# Patient Record
Sex: FEMALE | Race: OTHER | Employment: UNEMPLOYED | URBAN - METROPOLITAN AREA
[De-identification: names, ages, dates, MRNs, and addresses within clinical notes are randomized per-mention and may not be internally consistent; named-entity substitution may affect disease eponyms.]

---

## 2019-01-02 ENCOUNTER — APPOINTMENT (OUTPATIENT)
Dept: GENERAL RADIOLOGY | Age: 29
End: 2019-01-02

## 2019-01-02 ENCOUNTER — HOSPITAL ENCOUNTER (EMERGENCY)
Age: 29
Discharge: LEFT BEFORE DISCHARGE | End: 2019-01-02
Attending: EMERGENCY MEDICINE

## 2019-01-02 VITALS
HEART RATE: 95 BPM | WEIGHT: 160 LBS | SYSTOLIC BLOOD PRESSURE: 155 MMHG | OXYGEN SATURATION: 100 % | TEMPERATURE: 98 F | DIASTOLIC BLOOD PRESSURE: 93 MMHG | RESPIRATION RATE: 14 BRPM | HEIGHT: 67 IN | BODY MASS INDEX: 25.11 KG/M2

## 2019-01-02 DIAGNOSIS — R07.9 CHEST PAIN, UNSPECIFIED TYPE: Primary | ICD-10-CM

## 2019-01-02 DIAGNOSIS — N93.9 VAGINAL BLEEDING: ICD-10-CM

## 2019-01-02 LAB
-: ABNORMAL
ABSOLUTE EOS #: 0.05 K/UL (ref 0–0.44)
ABSOLUTE IMMATURE GRANULOCYTE: <0.03 K/UL (ref 0–0.3)
ABSOLUTE LYMPH #: 1.81 K/UL (ref 1.1–3.7)
ABSOLUTE MONO #: 0.22 K/UL (ref 0.1–1.2)
AMORPHOUS: ABNORMAL
ANION GAP SERPL CALCULATED.3IONS-SCNC: 17 MMOL/L (ref 9–17)
BACTERIA: ABNORMAL
BASOPHILS # BLD: 0 % (ref 0–2)
BASOPHILS ABSOLUTE: <0.03 K/UL (ref 0–0.2)
BILIRUBIN URINE: ABNORMAL
BUN BLDV-MCNC: 11 MG/DL (ref 6–20)
BUN/CREAT BLD: ABNORMAL (ref 9–20)
CALCIUM SERPL-MCNC: 9.3 MG/DL (ref 8.6–10.4)
CASTS UA: ABNORMAL /LPF (ref 0–8)
CHLORIDE BLD-SCNC: 102 MMOL/L (ref 98–107)
CO2: 25 MMOL/L (ref 20–31)
COLOR: ABNORMAL
COMMENT UA: ABNORMAL
CREAT SERPL-MCNC: 0.74 MG/DL (ref 0.5–0.9)
CRYSTALS, UA: ABNORMAL /HPF
DIFFERENTIAL TYPE: ABNORMAL
EKG ATRIAL RATE: 78 BPM
EKG P AXIS: 67 DEGREES
EKG P-R INTERVAL: 138 MS
EKG Q-T INTERVAL: 382 MS
EKG QRS DURATION: 84 MS
EKG QTC CALCULATION (BAZETT): 435 MS
EKG R AXIS: 78 DEGREES
EKG T AXIS: 63 DEGREES
EKG VENTRICULAR RATE: 78 BPM
EOSINOPHILS RELATIVE PERCENT: 1 % (ref 1–4)
EPITHELIAL CELLS UA: ABNORMAL /HPF (ref 0–5)
GFR AFRICAN AMERICAN: >60 ML/MIN
GFR NON-AFRICAN AMERICAN: >60 ML/MIN
GFR SERPL CREATININE-BSD FRML MDRD: ABNORMAL ML/MIN/{1.73_M2}
GFR SERPL CREATININE-BSD FRML MDRD: ABNORMAL ML/MIN/{1.73_M2}
GLUCOSE BLD-MCNC: 100 MG/DL (ref 70–99)
GLUCOSE URINE: NEGATIVE
HCG QUALITATIVE: NEGATIVE
HCT VFR BLD CALC: 38.2 % (ref 36.3–47.1)
HEMOGLOBIN: 11.2 G/DL (ref 11.9–15.1)
IMMATURE GRANULOCYTES: 0 %
KETONES, URINE: NEGATIVE
LEUKOCYTE ESTERASE, URINE: ABNORMAL
LYMPHOCYTES # BLD: 38 % (ref 24–43)
MCH RBC QN AUTO: 25.5 PG (ref 25.2–33.5)
MCHC RBC AUTO-ENTMCNC: 29.3 G/DL (ref 28.4–34.8)
MCV RBC AUTO: 86.8 FL (ref 82.6–102.9)
MONOCYTES # BLD: 5 % (ref 3–12)
MUCUS: ABNORMAL
NITRITE, URINE: NEGATIVE
NRBC AUTOMATED: 0 PER 100 WBC
OTHER OBSERVATIONS UA: ABNORMAL
PDW BLD-RTO: 14.2 % (ref 11.8–14.4)
PH UA: 5 (ref 5–8)
PLATELET # BLD: 216 K/UL (ref 138–453)
PLATELET ESTIMATE: ABNORMAL
PMV BLD AUTO: 10.3 FL (ref 8.1–13.5)
POTASSIUM SERPL-SCNC: 3.7 MMOL/L (ref 3.7–5.3)
PROTEIN UA: ABNORMAL
RBC # BLD: 4.4 M/UL (ref 3.95–5.11)
RBC # BLD: ABNORMAL 10*6/UL
RBC UA: ABNORMAL /HPF (ref 0–4)
RENAL EPITHELIAL, UA: ABNORMAL /HPF
SEG NEUTROPHILS: 56 % (ref 36–65)
SEGMENTED NEUTROPHILS ABSOLUTE COUNT: 2.61 K/UL (ref 1.5–8.1)
SODIUM BLD-SCNC: 144 MMOL/L (ref 135–144)
SPECIFIC GRAVITY UA: 1.04 (ref 1–1.03)
TRICHOMONAS: ABNORMAL
TROPONIN INTERP: NORMAL
TROPONIN T: NORMAL NG/ML
TROPONIN, HIGH SENSITIVITY: <6 NG/L (ref 0–14)
TURBIDITY: ABNORMAL
URINE HGB: ABNORMAL
UROBILINOGEN, URINE: NORMAL
WBC # BLD: 4.7 K/UL (ref 3.5–11.3)
WBC # BLD: ABNORMAL 10*3/UL
WBC UA: ABNORMAL /HPF (ref 0–5)
YEAST: ABNORMAL

## 2019-01-02 PROCEDURE — 93005 ELECTROCARDIOGRAM TRACING: CPT

## 2019-01-02 PROCEDURE — 80048 BASIC METABOLIC PNL TOTAL CA: CPT

## 2019-01-02 PROCEDURE — 85025 COMPLETE CBC W/AUTO DIFF WBC: CPT

## 2019-01-02 PROCEDURE — 81001 URINALYSIS AUTO W/SCOPE: CPT

## 2019-01-02 PROCEDURE — 84703 CHORIONIC GONADOTROPIN ASSAY: CPT

## 2019-01-02 PROCEDURE — 71046 X-RAY EXAM CHEST 2 VIEWS: CPT

## 2019-01-02 PROCEDURE — 87086 URINE CULTURE/COLONY COUNT: CPT

## 2019-01-02 PROCEDURE — 84484 ASSAY OF TROPONIN QUANT: CPT

## 2019-01-02 PROCEDURE — 99285 EMERGENCY DEPT VISIT HI MDM: CPT

## 2019-01-02 ASSESSMENT — PAIN DESCRIPTION - ORIENTATION: ORIENTATION: RIGHT;LOWER

## 2019-01-02 ASSESSMENT — PAIN DESCRIPTION - LOCATION: LOCATION: CHEST

## 2019-01-02 ASSESSMENT — PAIN SCALES - GENERAL: PAINLEVEL_OUTOF10: 10

## 2019-01-02 ASSESSMENT — PAIN DESCRIPTION - PAIN TYPE: TYPE: ACUTE PAIN

## 2019-01-03 LAB
CULTURE: NORMAL
Lab: NORMAL
SPECIMEN DESCRIPTION: NORMAL
STATUS: NORMAL

## 2019-01-03 ASSESSMENT — ENCOUNTER SYMPTOMS
BACK PAIN: 0
COLOR CHANGE: 0
SHORTNESS OF BREATH: 0
VOMITING: 0
NAUSEA: 0
WHEEZING: 0
ABDOMINAL PAIN: 0

## 2020-03-08 ENCOUNTER — HOSPITAL ENCOUNTER (EMERGENCY)
Age: 30
Discharge: HOME | End: 2020-03-09
Attending: EMERGENCY MEDICINE
Payer: MEDICAID

## 2020-03-08 ENCOUNTER — APPOINTMENT (OUTPATIENT)
Dept: GENERAL RADIOLOGY | Age: 30
End: 2020-03-08
Payer: MEDICAID

## 2020-03-08 VITALS
OXYGEN SATURATION: 98 % | SYSTOLIC BLOOD PRESSURE: 123 MMHG | TEMPERATURE: 98 F | DIASTOLIC BLOOD PRESSURE: 74 MMHG | RESPIRATION RATE: 12 BRPM | BODY MASS INDEX: 21.14 KG/M2 | HEART RATE: 83 BPM | WEIGHT: 135 LBS

## 2020-03-08 LAB
ABSOLUTE EOS #: 0.19 K/UL (ref 0–0.44)
ABSOLUTE IMMATURE GRANULOCYTE: 0.03 K/UL (ref 0–0.3)
ABSOLUTE LYMPH #: 1.68 K/UL (ref 1.1–3.7)
ABSOLUTE MONO #: 0.55 K/UL (ref 0.1–1.2)
ANION GAP SERPL CALCULATED.3IONS-SCNC: 13 MMOL/L (ref 9–17)
BASOPHILS # BLD: 0 % (ref 0–2)
BASOPHILS ABSOLUTE: 0.03 K/UL (ref 0–0.2)
BUN BLDV-MCNC: 5 MG/DL (ref 6–20)
BUN/CREAT BLD: ABNORMAL (ref 9–20)
CALCIUM SERPL-MCNC: 8.9 MG/DL (ref 8.6–10.4)
CHLORIDE BLD-SCNC: 101 MMOL/L (ref 98–107)
CO2: 21 MMOL/L (ref 20–31)
CREAT SERPL-MCNC: 0.73 MG/DL (ref 0.5–0.9)
DIFFERENTIAL TYPE: ABNORMAL
EOSINOPHILS RELATIVE PERCENT: 2 % (ref 1–4)
GFR AFRICAN AMERICAN: >60 ML/MIN
GFR NON-AFRICAN AMERICAN: >60 ML/MIN
GFR SERPL CREATININE-BSD FRML MDRD: ABNORMAL ML/MIN/{1.73_M2}
GFR SERPL CREATININE-BSD FRML MDRD: ABNORMAL ML/MIN/{1.73_M2}
GLUCOSE BLD-MCNC: 97 MG/DL (ref 70–99)
HCG QUALITATIVE: NEGATIVE
HCT VFR BLD CALC: 40.2 % (ref 36.3–47.1)
HEMOGLOBIN: 12.3 G/DL (ref 11.9–15.1)
IMMATURE GRANULOCYTES: 0 %
LYMPHOCYTES # BLD: 22 % (ref 24–43)
MCH RBC QN AUTO: 26.3 PG (ref 25.2–33.5)
MCHC RBC AUTO-ENTMCNC: 30.6 G/DL (ref 28.4–34.8)
MCV RBC AUTO: 85.9 FL (ref 82.6–102.9)
MONOCYTES # BLD: 7 % (ref 3–12)
NRBC AUTOMATED: 0 PER 100 WBC
PDW BLD-RTO: 14.3 % (ref 11.8–14.4)
PLATELET # BLD: 229 K/UL (ref 138–453)
PLATELET ESTIMATE: ABNORMAL
PMV BLD AUTO: 10.7 FL (ref 8.1–13.5)
POTASSIUM SERPL-SCNC: 4 MMOL/L (ref 3.7–5.3)
RBC # BLD: 4.68 M/UL (ref 3.95–5.11)
RBC # BLD: ABNORMAL 10*6/UL
SEG NEUTROPHILS: 69 % (ref 36–65)
SEGMENTED NEUTROPHILS ABSOLUTE COUNT: 5.29 K/UL (ref 1.5–8.1)
SODIUM BLD-SCNC: 135 MMOL/L (ref 135–144)
TROPONIN INTERP: NORMAL
TROPONIN T: NORMAL NG/ML
TROPONIN, HIGH SENSITIVITY: 7 NG/L (ref 0–14)
WBC # BLD: 7.8 K/UL (ref 3.5–11.3)
WBC # BLD: ABNORMAL 10*3/UL

## 2020-03-08 PROCEDURE — 93005 ELECTROCARDIOGRAM TRACING: CPT | Performed by: EMERGENCY MEDICINE

## 2020-03-08 PROCEDURE — 80048 BASIC METABOLIC PNL TOTAL CA: CPT

## 2020-03-08 PROCEDURE — 2580000003 HC RX 258: Performed by: EMERGENCY MEDICINE

## 2020-03-08 PROCEDURE — 6360000002 HC RX W HCPCS: Performed by: EMERGENCY MEDICINE

## 2020-03-08 PROCEDURE — 96365 THER/PROPH/DIAG IV INF INIT: CPT

## 2020-03-08 PROCEDURE — 99284 EMERGENCY DEPT VISIT MOD MDM: CPT

## 2020-03-08 PROCEDURE — 85025 COMPLETE CBC W/AUTO DIFF WBC: CPT

## 2020-03-08 PROCEDURE — 84484 ASSAY OF TROPONIN QUANT: CPT

## 2020-03-08 PROCEDURE — 71045 X-RAY EXAM CHEST 1 VIEW: CPT

## 2020-03-08 PROCEDURE — 96366 THER/PROPH/DIAG IV INF ADDON: CPT

## 2020-03-08 PROCEDURE — 84703 CHORIONIC GONADOTROPIN ASSAY: CPT

## 2020-03-08 RX ORDER — KETOROLAC TROMETHAMINE 30 MG/ML
30 INJECTION, SOLUTION INTRAMUSCULAR; INTRAVENOUS ONCE
Status: DISCONTINUED | OUTPATIENT
Start: 2020-03-08 | End: 2020-03-09 | Stop reason: HOSPADM

## 2020-03-08 RX ORDER — CEFAZOLIN SODIUM 1 G/50ML
1 INJECTION, SOLUTION INTRAVENOUS ONCE
Status: COMPLETED | OUTPATIENT
Start: 2020-03-08 | End: 2020-03-09

## 2020-03-08 RX ORDER — ONDANSETRON 2 MG/ML
4 INJECTION INTRAMUSCULAR; INTRAVENOUS ONCE
Status: DISCONTINUED | OUTPATIENT
Start: 2020-03-08 | End: 2020-03-09 | Stop reason: HOSPADM

## 2020-03-08 RX ORDER — NALOXONE HYDROCHLORIDE 0.4 MG/ML
0.4 INJECTION, SOLUTION INTRAMUSCULAR; INTRAVENOUS; SUBCUTANEOUS ONCE
Status: DISCONTINUED | OUTPATIENT
Start: 2020-03-08 | End: 2020-03-08

## 2020-03-08 RX ORDER — 0.9 % SODIUM CHLORIDE 0.9 %
1000 INTRAVENOUS SOLUTION INTRAVENOUS ONCE
Status: COMPLETED | OUTPATIENT
Start: 2020-03-08 | End: 2020-03-09

## 2020-03-08 RX ORDER — CEFAZOLIN SODIUM 1 G/50ML
INJECTION, SOLUTION INTRAVENOUS
Status: DISCONTINUED
Start: 2020-03-08 | End: 2020-03-09 | Stop reason: HOSPADM

## 2020-03-08 RX ORDER — NALOXONE HYDROCHLORIDE 1 MG/ML
2 INJECTION INTRAMUSCULAR; INTRAVENOUS; SUBCUTANEOUS ONCE
Status: COMPLETED | OUTPATIENT
Start: 2020-03-08 | End: 2020-03-09

## 2020-03-08 RX ADMIN — CEFAZOLIN SODIUM 1 G: 1 INJECTION, SOLUTION INTRAVENOUS at 22:30

## 2020-03-08 RX ADMIN — SODIUM CHLORIDE 1000 ML: 9 INJECTION, SOLUTION INTRAVENOUS at 22:30

## 2020-03-09 LAB
EKG ATRIAL RATE: 85 BPM
EKG P AXIS: 68 DEGREES
EKG P-R INTERVAL: 138 MS
EKG Q-T INTERVAL: 382 MS
EKG QRS DURATION: 80 MS
EKG QTC CALCULATION (BAZETT): 454 MS
EKG R AXIS: 60 DEGREES
EKG T AXIS: 44 DEGREES
EKG VENTRICULAR RATE: 85 BPM

## 2020-03-09 PROCEDURE — 6360000002 HC RX W HCPCS: Performed by: EMERGENCY MEDICINE

## 2020-03-09 PROCEDURE — 93010 ELECTROCARDIOGRAM REPORT: CPT | Performed by: INTERNAL MEDICINE

## 2020-03-09 PROCEDURE — 96375 TX/PRO/DX INJ NEW DRUG ADDON: CPT

## 2020-03-09 RX ORDER — IBUPROFEN 600 MG/1
600 TABLET ORAL EVERY 6 HOURS PRN
Qty: 30 TABLET | Refills: 0 | Status: SHIPPED | OUTPATIENT
Start: 2020-03-09 | End: 2022-07-28 | Stop reason: ALTCHOICE

## 2020-03-09 RX ORDER — CHLORHEXIDINE GLUCONATE 0.12 MG/ML
15 RINSE ORAL 2 TIMES DAILY
Qty: 420 ML | Refills: 0 | Status: SHIPPED | OUTPATIENT
Start: 2020-03-09 | End: 2020-03-23

## 2020-03-09 RX ORDER — NALOXONE HYDROCHLORIDE 1 MG/ML
2 INJECTION INTRAMUSCULAR; INTRAVENOUS; SUBCUTANEOUS ONCE
Status: COMPLETED | OUTPATIENT
Start: 2020-03-09 | End: 2020-03-09

## 2020-03-09 RX ORDER — ONDANSETRON 4 MG/1
4 TABLET, ORALLY DISINTEGRATING ORAL EVERY 8 HOURS PRN
Qty: 10 TABLET | Refills: 0 | Status: SHIPPED | OUTPATIENT
Start: 2020-03-09

## 2020-03-09 RX ORDER — MUPIROCIN CALCIUM 20 MG/G
CREAM TOPICAL 3 TIMES DAILY
Qty: 15 G | Refills: 0 | Status: SHIPPED | OUTPATIENT
Start: 2020-03-09 | End: 2022-09-13 | Stop reason: ALTCHOICE

## 2020-03-09 RX ORDER — AMMONIA INHALANTS 0.04 G/.3ML
INHALANT RESPIRATORY (INHALATION)
Status: DISCONTINUED
Start: 2020-03-09 | End: 2020-03-09 | Stop reason: HOSPADM

## 2020-03-09 RX ORDER — DOXYCYCLINE 100 MG/1
100 TABLET ORAL 2 TIMES DAILY
Qty: 20 TABLET | Refills: 0 | Status: SHIPPED | OUTPATIENT
Start: 2020-03-09 | End: 2020-03-19

## 2020-03-09 RX ADMIN — NALOXONE HYDROCHLORIDE 2 MG: 1 INJECTION PARENTERAL at 00:21

## 2020-03-09 RX ADMIN — NALOXONE HYDROCHLORIDE 2 MG: 1 INJECTION PARENTERAL at 00:14

## 2020-03-09 NOTE — ED PROVIDER NOTES
[facility]     Emergency Department     Faculty Attestation    I performed a history and physical examination of the patient and discussed management with the resident. I reviewed the resident´s note and agree with the documented findings and plan of care. Any areas of disagreement are noted on the chart. I was personally present for the key portions of any procedures. I have documented in the chart those procedures where I was not present during the key portions. I have reviewed the emergency nurses triage note. I agree with the chief complaint, past medical history, past surgical history, allergies, medications, social and family history as documented unless otherwise noted below. For Physician Assistant/ Nurse Practitioner cases/documentation I have personally evaluated this patient and have completed at least one if not all key elements of the E/M (history, physical exam, and MDM). Additional findings are as noted. Drowsy but answering questions appropriately, she was oriented to person place and the month, pupils are 2 mm and reactive, no meningeal signs, chest clear, heart exam normal, no lower extremity pain or swelling on examination. Old IV track marks on the forearms and the patient states she has not used in months. Patient states she took a pill today given to her by a friend but she does not know what it was. Patient brought in by the police.        EKG Interpretation    Interpreted by emergency department physician    Rhythm: normal sinus   Rate: normal/85  Axis: normal  Ectopy: none  Conduction: normal  ST Segments: no acute change  T Waves: no acute change  Q Waves: V1 and V2    Clinical Impression: Abnormal EKG    Flavio Aaron, CARLOS Aaron MD  03/08/20 7038

## 2020-03-14 NOTE — ED PROVIDER NOTES
[facility] ED  Emergency Department Encounter  EmergencyMedicine Resident     Pt Name:Maria Antonia Chaidez  MRN: [id number]  [place] 1990  Date of evaluation: 3/14/20  PCP:  Courtney Ahumada MD    CHIEF COMPLAINT       Chief Complaint   Patient presents with    Dizziness     pt. became lightheaded when being arrested by [employer]    Drug Overdose     pt. reports taking a pill from a friend, unknown substance       HISTORY OF PRESENT ILLNESS  (Location/Symptom, Timing/Onset, Context/Setting, Quality, Duration, Modifying Factors, Severity.)      Caio Sheriff is a 26 y.o. female who presents with [employer] for concern of drug overdose. Patient reportedly took a pill from her friend and is unsure of what it was. Patient does have a known history of IV drug abuse and opiate abuse. She is clinically intoxicated at this time, but reports no specific issues as of late. Patient is homeless. No recent fever or chills. She does feel little nauseated and vomited earlier today. Starting earlier today she also has had some dizziness and lightheadedness. Some chest pain and shortness of breath. She has no cardiac history and no known family cardiac history at a young age. She is not on any blood thinners. No known trauma or injury. She has no abdominal pain. She reports normal urination and defecation. She has an abnormal menstrual cycle but does not believe that she is pregnant at this time. No other complaints. PAST MEDICAL / SURGICAL / SOCIAL / FAMILY HISTORY      has no past medical history on file. has no past surgical history on file.     Social History     Socioeconomic History    Marital status: Single     Spouse name: Not on file    Number of children: Not on file    Years of education: Not on file    Highest education level: Not on file   Occupational History    Not on file   Social Needs    Financial resource strain: Not on file    Food insecurity     Worry: Not on file Inability: Not on file    Transportation needs     Medical: Not on file     Non-medical: Not on file   Tobacco Use    Smoking status: Current Every Day Smoker     Packs/day: 1.00     Types: Cigarettes    Smokeless tobacco: Never Used   Substance and Sexual Activity    Alcohol use: Yes     Comment: socially    Drug use: No    Sexual activity: Not on file   Lifestyle    Physical activity     Days per week: Not on file     Minutes per session: Not on file    Stress: Not on file   Relationships    Social connections     Talks on phone: Not on file     Gets together: Not on file     Attends Gnosticist service: Not on file     Active member of club or organization: Not on file     Attends meetings of clubs or organizations: Not on file     Relationship status: Not on file    Intimate partner violence     Fear of current or ex partner: Not on file     Emotionally abused: Not on file     Physically abused: Not on file     Forced sexual activity: Not on file   Other Topics Concern    Not on file   Social History Narrative    Not on file       History reviewed. No pertinent family history. Allergies:  Patient has no known allergies. Home Medications:  Prior to Admission medications    Medication Sig Start Date End Date Taking? Authorizing Provider   mupirocin (BACTROBAN) 2 % cream Apply topically 3 times daily Apply topically 3 times daily.  3/9/20  Yes David Rider MD   doxycycline monohydrate (ADOXA) 100 MG tablet Take 1 tablet by mouth 2 times daily for 10 days 3/9/20 3/19/20 Yes David Rider MD   chlorhexidine (PERIDEX) 0.12 % solution Take 15 mLs by mouth 2 times daily for 14 days 3/9/20 3/23/20 Yes David Rider MD   ibuprofen (ADVIL;MOTRIN) 600 MG tablet Take 1 tablet by mouth every 6 hours as needed for Pain 3/9/20  Yes David Rider MD   ondansetron (ZOFRAN ODT) 4 MG disintegrating tablet Take 1 tablet by mouth every 8 hours as needed for Nausea 3/9/20  Yes David Rider MD sounds: Normal breath sounds. No wheezing. Abdominal:      General: Bowel sounds are normal. There is no distension. Palpations: Abdomen is soft. Tenderness: There is no abdominal tenderness. There is no guarding or rebound. Musculoskeletal: Normal range of motion. General: No tenderness. Skin:     General: Skin is warm and dry. Findings: No rash. Neurological:      Mental Status: She is alert and oriented to person, place, and time. Psychiatric:         Behavior: Behavior normal.         DIFFERENTIAL  DIAGNOSIS     PLAN (LABS / IMAGING / EKG):  Orders Placed This Encounter   Procedures    XR CHEST PORTABLE    CBC WITH AUTO DIFFERENTIAL    BASIC METABOLIC PANEL    Troponin    Urinalysis Reflex to Culture    HCG Qualitative, Serum    EKG 12 Lead    EKG REPORT       MEDICATIONS ORDERED:  Orders Placed This Encounter   Medications    ceFAZolin (ANCEF) 1 g in dextrose 5 % 50 mL IVPB (premix)    0.9 % sodium chloride bolus    DISCONTD: ondansetron (ZOFRAN) injection 4 mg    DISCONTD: ketorolac (TORADOL) injection 30 mg    DISCONTD: naloxone (NARCAN) injection 0.4 mg    naloxone (NARCAN) injection 2 mg    DISCONTD: ceFAZolin (ANCEF) 1-4 GM/50ML-% IVPB (premix)     TRINH ALVAREZ: cabinet override    naloxone (NARCAN) injection 2 mg    DISCONTD: ammonia inhaler     TRINH ALVAREZ: cabinet override    mupirocin (BACTROBAN) 2 % cream     Sig: Apply topically 3 times daily Apply topically 3 times daily.      Dispense:  15 g     Refill:  0    doxycycline monohydrate (ADOXA) 100 MG tablet     Sig: Take 1 tablet by mouth 2 times daily for 10 days     Dispense:  20 tablet     Refill:  0    chlorhexidine (PERIDEX) 0.12 % solution     Sig: Take 15 mLs by mouth 2 times daily for 14 days     Dispense:  420 mL     Refill:  0    ibuprofen (ADVIL;MOTRIN) 600 MG tablet     Sig: Take 1 tablet by mouth every 6 hours as needed for Pain     Dispense:  30 tablet     Refill:  0    Value Ref Range    hCG Qual NEGATIVE NEGATIVE   EKG 12 Lead   Result Value Ref Range    Ventricular Rate 85 BPM    Atrial Rate 85 BPM    P-R Interval 138 ms    QRS Duration 80 ms    Q-T Interval 382 ms    QTc Calculation (Bazett) 454 ms    P Axis 68 degrees    R Axis 60 degrees    T Axis 44 degrees       RADIOLOGY:  Xr Chest Portable    Result Date: 3/8/2020  EXAMINATION: ONE XRAY VIEW OF THE CHEST 3/8/2020 10:51 pm COMPARISON: January 2, 2019 HISTORY: ORDERING SYSTEM PROVIDED HISTORY: chest pain TECHNOLOGIST PROVIDED HISTORY: chest pain Reason for Exam: upr,dizzy,od FINDINGS: The lungs are without acute focal process. There is no effusion or pneumothorax. The cardiomediastinal silhouette is without acute process. The osseous structures are without acute process. No acute process. EKG  EKG Interpretation    Interpreted by emergency department physician    Rhythm: normal sinus   Rate: normal  Axis: normal  Ectopy: none  Conduction: normal  ST Segments: normal  T Waves: normal  Q Waves: none    EKG  Impression: no acute changes and non-specific EKG        All EKG's are interpreted by the Emergency Department Physician who either signs or Co-signs this chart in the absence of a cardiologist.    EMERGENCY DEPARTMENT COURSE:  Patient seen and evaluated. She is laying in the bed and is sleeping, falls asleep multiple times throughout the examination and evaluation, but is easily arousable. Protecting her airway. On examination she is unkempt and is intoxicated again falling asleep and slurring words occasionally. She is in regular rate and rhythm, lungs are clear to auscultation bilaterally. She has no reproducible chest pain, although she reports some substernal chest pain and shortness of breath. She has no abdominal pain and is neurovascularly intact.   Patient has multiple lesions noted across her entire body from previous track marks from her IV drug use as well as some impetigo and superficial cellulitis. No abscesses noted that require drainage at this time. No other findings on examination. As patient is reporting chest pain or shortness of breath, a brief chest pain work-up was ordered. Patient given IV pain meds, antibiotics, antiemetics and IV fluids. Patient also given multiple doses of Narcan to keep her reverse the effects of her likely opioid ingestion. On review of the laboratory values, there are no acute abnormalities. Chest x-ray and EKG appear normal.  On reevaluation of the patient, she reported that her dizziness lightheadedness and chest pain were improving. Following additional Narcan and ammonium salts, patient was able to ambulate with some assistance. No additional intervention or work-up needed at this time. Patient stable ready to be discharged with police services. PROCEDURES:  None    CONSULTS:  None    CRITICAL CARE:  None    FINAL IMPRESSION      1. Accidental drug overdose, initial encounter    2. Chest pain, unspecified type    3. Dizziness    4. Impetigo    5. Cellulitis and abscess of leg    6. Gingivitis          DISPOSITION / PLAN     DISPOSITION Decision To Discharge 03/09/2020 12:20:35 AM      PATIENT REFERRED TO:  Claudeen Armour, MD  [address] [facility]  [phone]    Call in 2 days  As needed, If symptoms worsen      DISCHARGE MEDICATIONS:  Discharge Medication List as of 3/9/2020 12:25 AM      START taking these medications    Details   mupirocin (BACTROBAN) 2 % cream Apply topically 3 times daily Apply topically 3 times daily. , Topical, 3 TIMES DAILY Starting Mon 3/9/2020, Disp-15 g, R-0, Print      doxycycline monohydrate (ADOXA) 100 MG tablet Take 1 tablet by mouth 2 times daily for 10 days, Disp-20 tablet, R-0Print      chlorhexidine (PERIDEX) 0.12 % solution Take 15 mLs by mouth 2 times daily for 14 days, Disp-420 mL, R-0Print      ondansetron (ZOFRAN ODT) 4 MG disintegrating tablet Take 1 tablet by mouth every 8 hours as needed for Nausea, Disp-10 tablet, R-0Print             Bienvenido Bailey MD  Emergency Medicine Resident    (Please note that portions of thisnote were completed with a voice recognition program.  Efforts were made to edit the dictations but occasionally words are mis-transcribed.)       Bienvenido Bailey MD  Resident  03/19/20 0090

## 2020-03-19 ASSESSMENT — ENCOUNTER SYMPTOMS
VOMITING: 0
SORE THROAT: 0
COUGH: 0
ABDOMINAL PAIN: 0
DIARRHEA: 0
SHORTNESS OF BREATH: 1
EYE PAIN: 0
NAUSEA: 1

## 2022-06-16 ENCOUNTER — HOSPITAL ENCOUNTER (OUTPATIENT)
Age: 32
Setting detail: SPECIMEN
Discharge: HOME | End: 2022-06-16

## 2022-06-16 ENCOUNTER — OFFICE VISIT (OUTPATIENT)
Dept: FAMILY MEDICINE CLINIC | Age: 32
End: 2022-06-16
Payer: MEDICAID

## 2022-06-16 VITALS
TEMPERATURE: 97 F | WEIGHT: 208.6 LBS | DIASTOLIC BLOOD PRESSURE: 60 MMHG | HEART RATE: 75 BPM | HEIGHT: 67 IN | OXYGEN SATURATION: 95 % | BODY MASS INDEX: 32.74 KG/M2 | SYSTOLIC BLOOD PRESSURE: 112 MMHG

## 2022-06-16 DIAGNOSIS — Z3A.10 10 WEEKS GESTATION OF PREGNANCY: ICD-10-CM

## 2022-06-16 DIAGNOSIS — N91.2 AMENORRHEA: Primary | ICD-10-CM

## 2022-06-16 DIAGNOSIS — N89.8 VAGINAL DISCHARGE: ICD-10-CM

## 2022-06-16 LAB
ABO/RH: NORMAL
ABSOLUTE EOS #: 0.06 K/UL (ref 0–0.44)
ABSOLUTE IMMATURE GRANULOCYTE: <0.03 K/UL (ref 0–0.3)
ABSOLUTE LYMPH #: 2.18 K/UL (ref 1.1–3.7)
ABSOLUTE MONO #: 0.49 K/UL (ref 0.1–1.2)
ANTIBODY SCREEN: NEGATIVE
BASOPHILS # BLD: 0 % (ref 0–2)
BASOPHILS ABSOLUTE: 0.03 K/UL (ref 0–0.2)
CANDIDA SPECIES, DNA PROBE: NEGATIVE
EOSINOPHILS RELATIVE PERCENT: 1 % (ref 1–4)
GARDNERELLA VAGINALIS, DNA PROBE: POSITIVE
HCT VFR BLD CALC: 33 % (ref 36.3–47.1)
HEMOGLOBIN: 10.8 G/DL (ref 11.9–15.1)
HEPATITIS B SURFACE ANTIGEN: NONREACTIVE
IMMATURE GRANULOCYTES: 0 %
LYMPHOCYTES # BLD: 30 % (ref 24–43)
MCH RBC QN AUTO: 26.9 PG (ref 25.2–33.5)
MCHC RBC AUTO-ENTMCNC: 32.7 G/DL (ref 28.4–34.8)
MCV RBC AUTO: 82.1 FL (ref 82.6–102.9)
MONOCYTES # BLD: 7 % (ref 3–12)
NRBC AUTOMATED: 0 PER 100 WBC
PDW BLD-RTO: 14.1 % (ref 11.8–14.4)
PLATELET # BLD: 241 K/UL (ref 138–453)
PMV BLD AUTO: 11.7 FL (ref 8.1–13.5)
RBC # BLD: 4.02 M/UL (ref 3.95–5.11)
RBC # BLD: ABNORMAL 10*6/UL
RUBV IGG SER QL: 317.5 IU/ML
SEG NEUTROPHILS: 62 % (ref 36–65)
SEGMENTED NEUTROPHILS ABSOLUTE COUNT: 4.43 K/UL (ref 1.5–8.1)
SOURCE: ABNORMAL
T. PALLIDUM, IGG: NONREACTIVE
TRICHOMONAS VAGINALIS DNA: NEGATIVE
WBC # BLD: 7.2 K/UL (ref 3.5–11.3)

## 2022-06-16 PROCEDURE — 81002 URINALYSIS NONAUTO W/O SCOPE: CPT | Performed by: STUDENT IN AN ORGANIZED HEALTH CARE EDUCATION/TRAINING PROGRAM

## 2022-06-16 PROCEDURE — 99203 OFFICE O/P NEW LOW 30 MIN: CPT | Performed by: STUDENT IN AN ORGANIZED HEALTH CARE EDUCATION/TRAINING PROGRAM

## 2022-06-16 PROCEDURE — 81025 URINE PREGNANCY TEST: CPT | Performed by: STUDENT IN AN ORGANIZED HEALTH CARE EDUCATION/TRAINING PROGRAM

## 2022-06-16 RX ORDER — PNV NO.95/FERROUS FUM/FOLIC AC 28MG-0.8MG
1 TABLET ORAL DAILY
Qty: 30 TABLET | Refills: 5 | Status: SHIPPED | OUTPATIENT
Start: 2022-06-16 | End: 2022-07-28

## 2022-06-16 ASSESSMENT — PATIENT HEALTH QUESTIONNAIRE - PHQ9
SUM OF ALL RESPONSES TO PHQ QUESTIONS 1-9: 0
1. LITTLE INTEREST OR PLEASURE IN DOING THINGS: 0
SUM OF ALL RESPONSES TO PHQ QUESTIONS 1-9: 0
2. FEELING DOWN, DEPRESSED OR HOPELESS: 0
SUM OF ALL RESPONSES TO PHQ QUESTIONS 1-9: 0
SUM OF ALL RESPONSES TO PHQ QUESTIONS 1-9: 0
SUM OF ALL RESPONSES TO PHQ9 QUESTIONS 1 & 2: 0

## 2022-06-16 ASSESSMENT — ENCOUNTER SYMPTOMS: SHORTNESS OF BREATH: 0

## 2022-06-16 NOTE — PROGRESS NOTES
Subjective:    Sebastian Aaron is a 27 y.o. female with  has no past medical history on file. No family history on file. Presented tothe office today for:  Chief Complaint   Patient presents with    New Patient     Est . Care - Patient would like to discuss not having a period in a few months /  Patient states she has been having some vaginal discharge for about 2 or 3 weeks , milky white     Exposure to STD     would like to discuss getting tested for STD's        HPI 27year old female with no significant past medical history here to establish care. Amenorrhea  - Usually has normal periods  - Every month, lasts about 7 days  - Mild flow but heavier in the middle of the week  - No dysmenorrhea  - This is a new occurrence   - LMP was last week of march (about 2.5 months late)  - No spotting, no vaginal bleeding  - Sexually active with one person  - No birth control or contraception  - Did home pregnancy test one time which was negative  - Complaining of fatigue, some mild breast tenderness  - Denies nausea, vomiting    Vaginal Discharge  - Noticed few weeks ago  - Clear/white thin discharge, foul smelling sometimes  - Some mild pruritis at times  - Denies any dysuria, pelvic pain, increased urinary frequency    Review of Systems   Constitutional: Negative for fever. HENT: Negative for congestion. Respiratory: Negative for shortness of breath. Genitourinary: Positive for menstrual problem and vaginal discharge. Neurological: Negative for weakness. All other systems reviewed and are negative. Objective:    /60 (Site: Left Upper Arm, Position: Sitting, Cuff Size: Large Adult) Comment: manual  Pulse 75   Temp 97 °F (36.1 °C)   Ht 5' 7.01\" (1.702 m)   Wt 208 lb 9.6 oz (94.6 kg)   LMP 03/25/2022   SpO2 95%   BMI 32.66 kg/m²    BP Readings from Last 3 Encounters:   06/16/22 112/60   03/08/20 123/74   01/02/19 (!) 155/93     Physical Exam  Vitals reviewed.    Constitutional: negative      Requested Prescriptions     Signed Prescriptions Disp Refills    Prenatal Vit-Fe Fumarate-FA (PRENATAL VITAMIN) 27-0.8 MG TABS 30 tablet 5     Sig: Take 1 tablet by mouth daily       There are no discontinued medications. Return in about 2 weeks (around 6/30/2022) for 12 weeks gestation FU.

## 2022-06-16 NOTE — PROGRESS NOTES
Visit Information    Have you changed or started any medications since your last visit including any over-the-counter medicines, vitamins, or herbal medicines? no   Have you stopped taking any of your medications? Is so, why? -  no  Are you having any side effects from any of your medications? - no    Have you seen any other physician or provider since your last visit?  no   Have you had any other diagnostic tests since your last visit?  no   Have you been seen in the emergency room and/or had an admission in a hospital since we last saw you?  no   Have you had your routine dental cleaning in the past 6 months?  no     Do you have an active MyChart account? If no, what is the barrier? No -     Patient Care Team:  Dusty Larson MD as PCP - General    Medical History Review  Past Medical, Family, and Social History reviewed and does not contribute to the patient presenting condition    Health Maintenance   Topic Date Due    Varicella vaccine (1 of 2 - 2-dose childhood series) Never done    COVID-19 Vaccine (1) Never done    Pneumococcal 0-64 years Vaccine (1 - PCV) Never done    Depression Screen  Never done    HIV screen  Never done    Hepatitis C screen  Never done    DTaP/Tdap/Td vaccine (1 - Tdap) Never done    Cervical cancer screen  Never done    Flu vaccine (Season Ended) 2022    Hepatitis A vaccine  Aged Out    Hepatitis B vaccine  Aged Out    Hib vaccine  Aged Out    Meningococcal (ACWY) vaccine  Aged Out           Visit Information    Have you changed or started any medications since your last visit including any over-the-counter medicines, vitamins, or herbal medicines? no   Have you stopped taking any of your medications?  Is so, why? -  no  Are you having any side effects from any of your medications? - no    Have you seen any other physician or provider since your last visit?  no   Have you had any other diagnostic tests since your last visit?  no   Have you been seen in the emergency room and/or had an admission in a hospital since we last saw you?  no   Have you had your routine dental cleaning in the past 6 months?  no     Do you have an active MyChart account? If no, what is the barrier?   No:      Patient Care Team:  Valorie Caicedo MD as PCP - General    Medical History Review  Past Medical, Family, and Social History reviewed and does not contribute to the patient presenting condition    Health Maintenance   Topic Date Due    Varicella vaccine (1 of 2 - 2-dose childhood series) Never done    COVID-19 Vaccine (1) Never done    Pneumococcal 0-64 years Vaccine (1 - PCV) Never done    Depression Screen  Never done    HIV screen  Never done    Hepatitis C screen  Never done    DTaP/Tdap/Td vaccine (1 - Tdap) Never done    Cervical cancer screen  Never done    Flu vaccine (Season Ended) 2022    Hepatitis A vaccine  Aged Out    Hepatitis B vaccine  Aged Out    Hib vaccine  Aged Out    Meningococcal (ACWY) vaccine  Aged Out

## 2022-06-16 NOTE — PATIENT INSTRUCTIONS
Thank you for letting us take care of you today. We hope all your questions were addressed. If a question was overlooked or something else comes to mind after you return home, please contact a member of your Care Team listed below. Your Care Team at [place] [address] is Team #3  Graylon Goldberg, MD (Faculty)  Lashell Alvarado MD (Faculty  Beth Palacios MD (Resident)  Anel Lopez (Resident)   Linda Huntley MD (Resident)  Frieda Ruiz MD (Resident)  Shantanu Tirado., KIKO Pollock., RMA Verneita Meckel., ERROL Healy., Pricilla Davis., Madison Mejia ([address] [place] [address])  Ros Tucker (Clinical Practice Manager)  Lacie Hi, [facility] (Clinical Pharmacist)     Office phone number: [phone]    If you need to get in right away due to illness, please be advised we have \"Same Day\" appointments available Monday-Friday. Please call us at [phone] option #3 to schedule your \"Same Day\" appointment.

## 2022-06-16 NOTE — PROGRESS NOTES
I have reviewed and discussed key elements of [address] [facility] with the resident including plan of care and follow up and agree with the care orly plan. Diagnosis Orders   1. Amenorrhea  POCT urine pregnancy    POCT Urinalysis no Micro   2. 10 weeks gestation of pregnancy  Prenatal Profile 1    Prenatal type and screen    Prenatal Vit-Fe Fumarate-FA (PRENATAL VITAMIN) 27-0.8 MG TABS    Culture, Urine   3.  Vaginal discharge  POCT Urinalysis no Micro    Vaginitis DNA Probe    Chlamydia/GC DNA, Thin Prep     Needs ACOG intake jacinta with Dr. Mario Sloan

## 2022-06-17 DIAGNOSIS — B96.89 BV (BACTERIAL VAGINOSIS): Primary | ICD-10-CM

## 2022-06-17 DIAGNOSIS — N76.0 BV (BACTERIAL VAGINOSIS): Primary | ICD-10-CM

## 2022-06-17 LAB
C TRACH DNA GENITAL QL NAA+PROBE: NEGATIVE
CULTURE: NORMAL
N. GONORRHOEAE DNA: NEGATIVE
SPECIMEN DESCRIPTION: NORMAL
SPECIMEN DESCRIPTION: NORMAL

## 2022-06-17 RX ORDER — METRONIDAZOLE 500 MG/1
500 TABLET ORAL 2 TIMES DAILY
Qty: 14 TABLET | Refills: 0 | Status: SHIPPED | OUTPATIENT
Start: 2022-06-17 | End: 2022-06-24

## 2022-06-30 ENCOUNTER — HOSPITAL ENCOUNTER (OUTPATIENT)
Age: 32
Setting detail: SPECIMEN
Discharge: HOME | End: 2022-06-30

## 2022-06-30 ENCOUNTER — OFFICE VISIT (OUTPATIENT)
Dept: FAMILY MEDICINE CLINIC | Age: 32
End: 2022-06-30
Payer: MEDICAID

## 2022-06-30 VITALS
BODY MASS INDEX: 33.09 KG/M2 | HEIGHT: 67 IN | SYSTOLIC BLOOD PRESSURE: 121 MMHG | DIASTOLIC BLOOD PRESSURE: 81 MMHG | HEART RATE: 105 BPM | TEMPERATURE: 97.7 F | WEIGHT: 210.8 LBS

## 2022-06-30 DIAGNOSIS — Z3A.12 12 WEEKS GESTATION OF PREGNANCY: Primary | ICD-10-CM

## 2022-06-30 DIAGNOSIS — Z3A.12 12 WEEKS GESTATION OF PREGNANCY: ICD-10-CM

## 2022-06-30 LAB — HIV AG/AB: NONREACTIVE

## 2022-06-30 PROCEDURE — 99213 OFFICE O/P EST LOW 20 MIN: CPT | Performed by: STUDENT IN AN ORGANIZED HEALTH CARE EDUCATION/TRAINING PROGRAM

## 2022-06-30 PROCEDURE — G8427 DOCREV CUR MEDS BY ELIG CLIN: HCPCS | Performed by: STUDENT IN AN ORGANIZED HEALTH CARE EDUCATION/TRAINING PROGRAM

## 2022-06-30 PROCEDURE — G8417 CALC BMI ABV UP PARAM F/U: HCPCS | Performed by: STUDENT IN AN ORGANIZED HEALTH CARE EDUCATION/TRAINING PROGRAM

## 2022-06-30 PROCEDURE — 4004F PT TOBACCO SCREEN RCVD TLK: CPT | Performed by: STUDENT IN AN ORGANIZED HEALTH CARE EDUCATION/TRAINING PROGRAM

## 2022-06-30 SDOH — ECONOMIC STABILITY: FOOD INSECURITY: WITHIN THE PAST 12 MONTHS, THE FOOD YOU BOUGHT JUST DIDN'T LAST AND YOU DIDN'T HAVE MONEY TO GET MORE.: NEVER TRUE

## 2022-06-30 SDOH — ECONOMIC STABILITY: FOOD INSECURITY: WITHIN THE PAST 12 MONTHS, YOU WORRIED THAT YOUR FOOD WOULD RUN OUT BEFORE YOU GOT MONEY TO BUY MORE.: NEVER TRUE

## 2022-06-30 ASSESSMENT — SOCIAL DETERMINANTS OF HEALTH (SDOH): HOW HARD IS IT FOR YOU TO PAY FOR THE VERY BASICS LIKE FOOD, HOUSING, MEDICAL CARE, AND HEATING?: NOT HARD AT ALL

## 2022-06-30 NOTE — PROGRESS NOTES
Visit Information    Have you changed or started any medications since your last visit including any over-the-counter medicines, vitamins, or herbal medicines? no   Have you stopped taking any of your medications? Is so, why? -  no  Are you having any side effects from any of your medications? - no    Have you seen any other physician or provider since your last visit?  no   Have you had any other diagnostic tests since your last visit? yes - Labs   Have you been seen in the emergency room and/or had an admission in a hospital since we last saw you?  no   Have you had your routine dental cleaning in the past 6 months?  no     Do you have an active MyChart account? If no, what is the barrier?   No: Pending    Patient Care Team:  Violeta Don MD as PCP - General    Medical History Review  Past Medical, Family, and Social History reviewed and does contribute to the patient presenting condition    Health Maintenance   Topic Date Due    Varicella vaccine (1 of 2 - 2-dose childhood series) Never done    COVID-19 Vaccine (1) Never done    HIV screen  Never done    Hepatitis C screen  Never done    DTaP/Tdap/Td vaccine (1 - Tdap) Never done    Cervical cancer screen  Never done    Flu vaccine (Season Ended) 09/01/2022    Depression Screen  06/16/2023    Hepatitis A vaccine  Aged Out    Hepatitis B vaccine  Aged Out    Hib vaccine  Aged Out    Meningococcal (ACWY) vaccine  Aged Out    Pneumococcal 0-64 years Vaccine  Aged Out

## 2022-06-30 NOTE — PATIENT INSTRUCTIONS
Thank you for letting us take care of you today. We hope all your questions were addressed. If a question was overlooked or something else comes to mind after you return home, please contact a member of your Care Team listed below. Your Care Team at [place] [address] is Team #3  Asaf Kelley MD (Faculty)  Julianna Paredes MD (Faculty  Tanvi Kramer MD (Resident)  Gisell Moe (Resident)   Freedom Colon MD (Resident)  Violeta Juan MD (Resident)  Elsi James., KIKO Fong., KIKO Tejeda., ERROL Hennessy., Chani Pérez., Dion Reyes ([address] [place] [address])  Ros Stearns (Clinical Practice Manager)  [address] (Clinical Pharmacist)     Office phone number: [phone]    If you need to get in right away due to illness, please be advised we have \"Same Day\" appointments available Monday-Friday. Please call us at [phone] option #3 to schedule your \"Same Day\" appointment.

## 2022-06-30 NOTE — PROGRESS NOTES
Attending Physician Statement  I  have discussed the care of Mary Carroll including pertinent history and exam findings with the resident. I agree with the assessment, plan and orders as documented by the resident. /81 (Site: Right Upper Arm, Position: Sitting, Cuff Size: Medium Adult)   Pulse (!) 105   Temp 97.7 °F (36.5 °C) (Temporal)   Ht 5' 7.01\" (1.702 m)   Wt 210 lb 12.8 oz (95.6 kg)   LMP 03/30/2022 (Exact Date)   BMI 33.01 kg/m²    BP Readings from Last 3 Encounters:   06/30/22 121/81   06/16/22 112/60   03/08/20 123/74     Wt Readings from Last 3 Encounters:   06/30/22 210 lb 12.8 oz (95.6 kg)   06/16/22 208 lb 9.6 oz (94.6 kg)   03/08/20 135 lb (61.2 kg)          Diagnosis Orders   1. 12 weeks gestation of pregnancy  External Referral To Maternal Fetal Medicine    Varicella Zoster Antibody, IgG    HIV Screen     To see Dr. Lb Strauss in 3 weeks for oversight visit.     Brooklynn Redman DO 6/30/2022 11:52 AM

## 2022-07-01 LAB — VZV IGG SER QL IA: 2.28

## 2022-07-07 ENCOUNTER — TELEPHONE (OUTPATIENT)
Dept: FAMILY MEDICINE CLINIC | Age: 32
End: 2022-07-07

## 2022-07-07 NOTE — TELEPHONE ENCOUNTER
----- Message from [facility] sent at 7/7/2022  9:15 AM EDT -----  Subject: Message to Provider    QUESTIONS  Information for Provider? Pt needs her referral sent over for internal   fetal medicine to [facility]. Pt stated that they   wouldn't accept the hand copy. The office needs to send a referral and her   information to their office, so that she can be scheduled. Pls give the pt   a call once the referral has been sent.  ---------------------------------------------------------------------------  --------------  Vern MEHTA  [phone]; OK to leave message on voicemail  ---------------------------------------------------------------------------  --------------  SCRIPT ANSWERS  Relationship to Patient?  Self

## 2022-07-09 NOTE — TELEPHONE ENCOUNTER
I did the referral during my last visit with her.  I don't have the instructions with me so I will have to do it on Monday when I am in clinic

## 2022-07-12 ENCOUNTER — TELEPHONE (OUTPATIENT)
Dept: FAMILY MEDICINE CLINIC | Age: 32
End: 2022-07-12

## 2022-07-12 DIAGNOSIS — Z3A.12 12 WEEKS GESTATION OF PREGNANCY: Primary | ICD-10-CM

## 2022-07-12 NOTE — TELEPHONE ENCOUNTER
I spoke to patient. There are some issues she is having with the [facility] referral I sent. I tried to resend this referral and also got an utlrasound for her. Can you double check if it is done correctly? The [facility] office told her that our office has to send them something?

## 2022-07-12 NOTE — TELEPHONE ENCOUNTER
----- Message from Oscar Cart sent at 7/12/2022 12:06 PM EDT -----  Subject: Message to Provider    QUESTIONS  Information for Provider? Patient stated she has been calling for over a   week in regards to referral for ultrasound. Patient stated it was sent to   the wrong place. Patient is requesting a urgent call back today  ---------------------------------------------------------------------------  --------------  Luis Vu INFO  [phone]; OK to leave message on voicemail  ---------------------------------------------------------------------------  --------------  SCRIPT ANSWERS  Relationship to Patient?  Self

## 2022-07-13 NOTE — TELEPHONE ENCOUNTER
The referral needs to have the reason why the patient need to be seen with MFM. You have to check what applies to the patient inside the referral before they will schedule her, please advise.

## 2022-07-14 NOTE — TELEPHONE ENCOUNTER
No it is not correct. The referral from 7/12/22 to Pernatology needs to have the answers CHECKED inside the referral before they will schedule the patient. The US is correct but the referral for MFM needs the questions answered.

## 2022-07-19 ENCOUNTER — TELEPHONE (OUTPATIENT)
Dept: FAMILY MEDICINE CLINIC | Age: 32
End: 2022-07-19

## 2022-07-19 ENCOUNTER — TELEPHONE (OUTPATIENT)
Dept: SOCIAL WORK | Age: 32
End: 2022-07-19

## 2022-07-19 ENCOUNTER — HOSPITAL ENCOUNTER (OUTPATIENT)
Dept: ULTRASOUND IMAGING | Age: 32
Discharge: HOME | End: 2022-07-21
Payer: MEDICAID

## 2022-07-19 DIAGNOSIS — Z3A.16 16 WEEKS GESTATION OF PREGNANCY: Primary | ICD-10-CM

## 2022-07-19 DIAGNOSIS — Z3A.16 16 WEEKS GESTATION OF PREGNANCY: ICD-10-CM

## 2022-07-19 DIAGNOSIS — Z3A.12 12 WEEKS GESTATION OF PREGNANCY: ICD-10-CM

## 2022-07-19 PROCEDURE — 76815 OB US LIMITED FETUS(S): CPT

## 2022-07-19 NOTE — TELEPHONE ENCOUNTER
The radiology department called and stated that they don't do trans vaginal ultrasounds at 16 wks.  The ultrasound for the patient need to be an OB limited  Patient appointment is at 10am

## 2022-07-19 NOTE — TELEPHONE ENCOUNTER
Mother and Child Dependency Program     Sw met with Pt at [facility] to complete intake on 7/19/2022. Pt was agreeable to joining the [facility]. Pt does not want to join [facility] currently. Substance Use History  Pt reports a history of IV Heroin use. Last date of reported heroin use: February, 2021    Pt reported that she reached rock bottom due to legal issues and substance use issues and decided to turn herself in February 2021 and served 5 months in senior living. Pt stated that she knew what she needed to do if she wanted custody of her kids back. Treatment  Pt reports that she is in treatment at [place] [address] where she sees a counselor and has been in treatment for a while. Reports that her children also receive counseling at this facility. Tobacco  Pt reports nicotine use daily, uses a vape. Support System  Pt reports that the father of the baby is involved and supportive of the Pt and her pregnancy. Pt reports that her mom and her children are also very supportive. Children  Pt reports that she has 4 children, all girls (11, 12, 15, 13). Pt reports that she signed over temporary custody to a family member when in active addiction, but recently got custody of all four children back from that family member. Pt reports that [facility] was called, but that parental rights were never terminated. Legal  Pt reported that she was in senior living from February, 2021-July 16th, 2021. Is currently on probation. PO officer: Oleksandr Guardado. Resources  Sw has identified resource needs including car seat, crib, baby items, medical home, OB/GYN, housing, and [facility].

## 2022-07-28 ENCOUNTER — SCHEDULED TELEPHONE ENCOUNTER (OUTPATIENT)
Dept: OBGYN | Age: 32
End: 2022-07-28

## 2022-07-28 DIAGNOSIS — E66.3 OVERWEIGHT (BMI 25.0-29.9): ICD-10-CM

## 2022-07-28 DIAGNOSIS — Z28.310 UNVACCINATED FOR COVID-19: ICD-10-CM

## 2022-07-28 DIAGNOSIS — Z78.9 ELECTRONIC CIGARETTE USE: ICD-10-CM

## 2022-07-28 DIAGNOSIS — O09.90 HIGH RISK PREGNANCY, ANTEPARTUM: ICD-10-CM

## 2022-07-28 DIAGNOSIS — F11.90 OPIOID USE DISORDER: ICD-10-CM

## 2022-07-28 RX ORDER — ASPIRIN 81 MG/1
81 TABLET ORAL DAILY
Qty: 30 TABLET | Refills: 5 | Status: SHIPPED | OUTPATIENT
Start: 2022-07-28

## 2022-07-28 RX ORDER — PNV NO.95/FERROUS FUM/FOLIC AC 28MG-0.8MG
1 TABLET ORAL DAILY
Qty: 30 TABLET | Refills: 12 | Status: SHIPPED | OUTPATIENT
Start: 2022-07-28
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
## 2022-08-08 ENCOUNTER — INITIAL PRENATAL (OUTPATIENT)
Dept: OBGYN | Age: 32
End: 2022-08-08
Payer: MEDICAID

## 2022-08-08 ENCOUNTER — HOSPITAL ENCOUNTER (OUTPATIENT)
Age: 32
Setting detail: SPECIMEN
Discharge: HOME | End: 2022-08-08

## 2022-08-08 VITALS
WEIGHT: 225 LBS | HEART RATE: 92 BPM | DIASTOLIC BLOOD PRESSURE: 78 MMHG | BODY MASS INDEX: 35.23 KG/M2 | SYSTOLIC BLOOD PRESSURE: 127 MMHG

## 2022-08-08 DIAGNOSIS — O09.90 HIGH RISK PREGNANCY, ANTEPARTUM: ICD-10-CM

## 2022-08-08 DIAGNOSIS — Z34.80 SUPERVISION OF OTHER NORMAL PREGNANCY: Primary | ICD-10-CM

## 2022-08-08 DIAGNOSIS — Z3A.20 20 WEEKS GESTATION OF PREGNANCY: ICD-10-CM

## 2022-08-08 PROCEDURE — G8427 DOCREV CUR MEDS BY ELIG CLIN: HCPCS | Performed by: STUDENT IN AN ORGANIZED HEALTH CARE EDUCATION/TRAINING PROGRAM

## 2022-08-08 PROCEDURE — G8417 CALC BMI ABV UP PARAM F/U: HCPCS | Performed by: STUDENT IN AN ORGANIZED HEALTH CARE EDUCATION/TRAINING PROGRAM

## 2022-08-08 PROCEDURE — 1036F TOBACCO NON-USER: CPT | Performed by: STUDENT IN AN ORGANIZED HEALTH CARE EDUCATION/TRAINING PROGRAM

## 2022-08-08 PROCEDURE — 99213 OFFICE O/P EST LOW 20 MIN: CPT | Performed by: STUDENT IN AN ORGANIZED HEALTH CARE EDUCATION/TRAINING PROGRAM

## 2022-08-08 NOTE — PROGRESS NOTES
[facility] OB/GYN  Initial Prenatal Visit    CC: Initial Prenatal Visit    HPI:   Dom Husbands is a 27 y.o. female [id number] at 20w1d  She is being seen today for her first obstetrical visit. Pregnancy history fully reviewed. This is a planned pregnancy. Her LMP is Patient's last menstrual period was 2022. Her obstetrical history is significant for tobacco use, excessive weight gain and obesity. The patient was seen and evaluated. The patient denies any complaints. There was positive fetal movements. She denies contractions, vaginal bleeding and leakage of fluid. She currently denies any signs or symptoms of pre-eclampsia which include headache, vision changes, RUQ pain. The patient requested the T-Dap Vaccine (27-36 weeks) this pregnancy. The patient is Rh positive and Rhogam is not indicated in this pregnancy  The patient declined the COVID-19 vaccine this year. Relationship with FOB: biancaance  Mother's ethnicity:  and   Father's ethnicity:   Family History:    - Neural tube defects: No   - Congenital birth defects (congenital heart defects, polydactyly, cleft lip/palate): No   - Intellectual disability: No   - Genetic disorders/chromosomal abnormalities: No   - Diabetes mellitus in first degree relatives: No  Genetic screening was discussed and patient requested.     OB History:  OB History    Para Term  AB Living   8 4 4 0 3 4   SAB IAB Ectopic Molar Multiple Live Births   0 3 0 0 0 4      # Outcome Date GA Lbr Valdo/2nd Weight Sex Delivery Anes PTL Lv   8 Current            7 IAB            6 Term 12    F Vag-Spont   VISHAL   5 IAB            4 Term 12/03/10    F Vag-Spont   VISHAL   3 Term 09    F Vag-Spont   VISHAL   2 IAB            1 Term 05/15/07    F Vag-Spont   VISHAL      Obstetric Comments   FOB #1 -- G1   FOB #2 -- G2-G7   FOB #3 -- G8       Past Medical History:  Past Medical History:   Diagnosis Date    Trauma     Pt says \"the list goes on. I don't even know where to start. \"       Past Surgical History:  History reviewed. No pertinent surgical history. Medications:  Current Outpatient Medications on File Prior to Visit   Medication Sig Dispense Refill    Prenatal Vit-Fe Fumarate-FA (PRENATAL VITAMIN) 27-0.8 MG TABS Take 1 tablet by mouth daily May substitute with any prenatal vitamin covered by the patient's insurance. 30 tablet 12    aspirin EC 81 MG EC tablet Take 1 tablet by mouth in the morning. (Patient not taking: Reported on 2022) 30 tablet 5    mupirocin (BACTROBAN) 2 % cream Apply topically 3 times daily Apply topically 3 times daily. (Patient not taking: No sig reported) 15 g 0    ondansetron (ZOFRAN ODT) 4 MG disintegrating tablet Take 1 tablet by mouth every 8 hours as needed for Nausea (Patient not taking: No sig reported) 10 tablet 0     No current facility-administered medications on file prior to visit. Allergies:   Allergies as of 2022    (No Known Allergies)       Social History:  Social History     Socioeconomic History    Marital status: Single     Spouse name: Not on file    Number of children: Not on file    Years of education: Not on file    Highest education level: Not on file   Occupational History    Not on file   Tobacco Use    Smoking status: Former     Packs/day: 1.00     Years: 10.00     Pack years: 10.00     Types: Cigarettes     Quit date:      Years since quittin.6     Passive exposure: Never    Smokeless tobacco: Never   Vaping Use    Vaping Use: Every day    Substances: Nicotine    Passive vaping exposure: Yes   Substance and Sexual Activity    Alcohol use: Not Currently     Comment: Last drink sometime in , per patient    Drug use: Not Currently     Types: IV, Heroin     Comment: 3/28/2021 clean date; in treatment with [facility]    Sexual activity: Yes     Partners: Male   Other Topics Concern    Not on file   Social History Narrative    Not on file     Social Determinants of Health     Financial Resource Strain: Low Risk     Difficulty of Paying Living Expenses: Not hard at all   Food Insecurity: No Food Insecurity    Worried About Running Out of Food in the Last Year: Never true    Ran Out of Food in the Last Year: Never true   Transportation Needs: Not on file   Physical Activity: Not on file   Stress: Not on file   Social Connections: Not on file   Intimate Partner Violence: Not on file   Housing Stability: Not on file       Family History:  Family History   Problem Relation Age of Onset    No Known Problems Paternal Grandfather     No Known Problems Paternal Grandmother     No Known Problems Maternal Grandmother     Diabetes Maternal Grandfather     Kidney Disease Father     Hypertension Mother     No Known Problems Half-Brother     No Known Problems Half-Sister     No Known Problems Sister        Vitals:    BP: 127/78  Weight: 225 lb (102.1 kg)  Heart Rate: 92  Patient Position: Sitting  Albumin: Negative  Glucose: Negative  Fundal Height (cm): 20 cm  Fetal HR: 140  Movement: Present     Physical Exam: Completed, See Epic Navigator   Chaperone for Intimate Exam: Chaperone was present for entire exam, Chaperone Name: Jordana PATTEN       Assessment & Plan:  Danita Linton is a 27 y.o. female [id number] at 25w1d Initial Obstetrical Visit   - The patient was seen full history and physical was completed/reviewed. - Prenatal labs pending   - Prenatal vitamins prescription PRN   - Aspirin indication: not indicated   - Problem list reviewed and updated   - NT Screen/Quad Testing and MSAFP Single Marker/NIPT: requested, lab ordered   - Role of ultrasound in pregnancy discussed; requests fetal survey, MFM referral sent; anatomy US scheduled 9/13   - Gc/Chlam Cultures & Vaginitis: negative 6/16/22   - Last pap smear N/A- Pap Smear done   - Tdap vaccination: discussed recommendations for TDAP immunization, patient requested.    - Rhogam: not indicated   - COVID-19 vaccination: R/B/A discussed with increased risk of both maternal and fetal morbidity and mortality in unvaccinated pregnant patients who contract COVID-19- patient declined today   - Indications for  section:  N/A    Upon completion of the visit all questions were answered and the patients follow-up and testing schedule were reviewed. Patient Active Problem List    Diagnosis Date Noted    High-risk pregnancy 2022     Priority: Medium     Risks: Opioid use disorder; daily e cigarette use; overweight; unvaccinated for COVID-19    Fetal testing indicated: No  Fetal testing indication: N/A  Fetal testing initiation: N/A  Fetal testing frequency: N/A      Opioid use disorder 2022     IV heroin, clean 3/28/21. Currently in treatment at [facility]. However, they have initiated her discharge, as she has completed the program. No MAT.    22: Declines participation in [facility] at this time. Unvaccinated for covid-19 2022      Pt counseled on the the risks of not getting vaccinated in pregnancy against COVID-19. Pregnant women with symptomatic covid have a 70% increased risk of death. Covid-19 during pregnancy  increases the risk for adverse outcomes, including  birth and admission of the baby to an intensive care unit. Electronic cigarette use 2022     Vapes nicotine daily. Maternal and fetal risks reviewed. Patient verbalizes understanding. Overweight (BMI 25.0-29.9) 2022     Pregravid BMI 29.75       Return in about 4 weeks (around 2022) for 4 week STEVE Jackson DO  Ob/Gyn Resident  [facility], [address]  2022, 2:27 PM

## 2022-08-08 NOTE — PROGRESS NOTES
Patient came in for [employer] genetic testing and prenatal labs. Patient's questions were answered and she was escorted to the lab. [employer] testing kit was completed with blood specimen, lab orders, face sheet and insurance card. Placed in [facility] Ex bag and ready for  in room 7.

## 2022-08-09 DIAGNOSIS — R76.8 HEPATITIS C ANTIBODY TEST POSITIVE: Primary | ICD-10-CM

## 2022-08-09 LAB
AMPHETAMINE SCREEN URINE: NEGATIVE
BARBITURATE SCREEN URINE: NEGATIVE
BENZODIAZEPINE SCREEN, URINE: NEGATIVE
CANNABINOID SCREEN URINE: NEGATIVE
COCAINE METABOLITE, URINE: NEGATIVE
FENTANYL URINE: NEGATIVE
HEPATITIS C ANTIBODY: REACTIVE
METHADONE SCREEN, URINE: NEGATIVE
OPIATES, URINE: NEGATIVE
OXYCODONE SCREEN URINE: NEGATIVE
PHENCYCLIDINE, URINE: NEGATIVE
TEST INFORMATION: NORMAL

## 2022-08-10 ENCOUNTER — HOSPITAL ENCOUNTER (OUTPATIENT)
Age: 32
Setting detail: SPECIMEN
Discharge: HOME | End: 2022-08-10
Payer: MEDICAID

## 2022-08-10 DIAGNOSIS — R76.8 HEPATITIS C ANTIBODY TEST POSITIVE: ICD-10-CM

## 2022-08-10 PROCEDURE — 87522 HEPATITIS C REVRS TRNSCRPJ: CPT

## 2022-08-10 PROCEDURE — 36415 COLL VENOUS BLD VENIPUNCTURE: CPT

## 2022-08-11 LAB
AFP INTERPRETATION: NORMAL
AFP MOM: 1.15
AFP SPECIMEN: NORMAL
AFP: 58 NG/ML
DATE OF BIRTH: NORMAL
DATING METHOD: NORMAL
DETERMINED BY: NORMAL
DIABETIC: NEGATIVE
DONOR EGG?: NORMAL
DUE DATE: NORMAL
ESTIMATED DUE DATE: NORMAL
FAMILY HISTORY NTD: NEGATIVE
GESTATIONAL AGE: NORMAL
IN VITRO FERTILIZATION: NORMAL
INSULIN REQ DIABETES: NO
LAST MENSTRUAL PERIOD: NORMAL
MATERNAL AGE AT EDD: 33.3 YR
MATERNAL WEIGHT: 225
MONOCHORIONIC TWINS: NORMAL
NUMBER OF FETUSES: NORMAL
PATIENT WEIGHT UNITS: NORMAL
PATIENT WEIGHT: NORMAL
RACE (MATERNAL): NORMAL
RACE: NORMAL
REPEAT SPECIMEN?: NORMAL
SMOKING: NORMAL
SMOKING: NORMAL
VALPROIC/CARBAMAZEP: NORMAL
ZZ NTE CLEAN UP: HISTORY: NO

## 2022-08-13 LAB
HEPATITIS C RNA PCR QUANT: DETECTED
HEPATITIS C RNA QUANT LOG: 5.43 LOG IU/ML
HEPATITIS C RNA-PCR: ABNORMAL IU/ML
SOURCE: ABNORMAL

## 2022-08-15 DIAGNOSIS — B18.2 HEP C W/O COMA, CHRONIC (HCC): Primary | ICD-10-CM

## 2022-08-15 NOTE — RESULT ENCOUNTER NOTE
Abnormal results . Positive for Hep C virus. Patient will need GI referral post partum for treatment. Recommend liver function panel. Order entered. Please inform patient.

## 2022-08-19 LAB — CYTOLOGY REPORT: NORMAL

## 2022-08-23 PROBLEM — R87.610 ASCUS WITH POSITIVE HIGH RISK HPV CERVICAL: Status: ACTIVE | Noted: 2022-08-23

## 2022-08-23 PROBLEM — R87.810 ASCUS WITH POSITIVE HIGH RISK HPV CERVICAL: Status: ACTIVE | Noted: 2022-08-23

## 2022-08-23 LAB
HPV SAMPLE: ABNORMAL
HPV, GENOTYPE 16: NOT DETECTED
HPV, GENOTYPE 18: DETECTED
HPV, HIGH RISK OTHER: DETECTED
HPV, INTERPRETATION: ABNORMAL
SPECIMEN DESCRIPTION: ABNORMAL

## 2022-08-24 DIAGNOSIS — O09.90 HIGH RISK PREGNANCY, ANTEPARTUM: ICD-10-CM

## 2022-08-24 PROBLEM — D56.3 ALPHA THALASSEMIA SILENT CARRIER: Status: ACTIVE | Noted: 2022-08-24

## 2022-08-24 NOTE — RESULT ENCOUNTER NOTE
Abnormal Pap, Ascus with positive HPV testing. Re: colposcopy  Please notify patient and schedule colposcopy.

## 2022-08-25 ENCOUNTER — TELEPHONE (OUTPATIENT)
Dept: OBGYN | Age: 32
End: 2022-08-25

## 2022-08-25 NOTE — TELEPHONE ENCOUNTER
Writer called pt concerning Lab Results per Dr. Arce Speaker, procedure schedule. See Results Note from 8/24/2022.  Thank You

## 2022-09-06 ENCOUNTER — HOSPITAL ENCOUNTER (OUTPATIENT)
Age: 32
Setting detail: SPECIMEN
Discharge: HOME | End: 2022-09-06

## 2022-09-06 ENCOUNTER — ROUTINE PRENATAL (OUTPATIENT)
Dept: OBGYN | Age: 32
End: 2022-09-06
Payer: MEDICAID

## 2022-09-06 VITALS
WEIGHT: 228 LBS | SYSTOLIC BLOOD PRESSURE: 122 MMHG | BODY MASS INDEX: 35.7 KG/M2 | HEART RATE: 89 BPM | DIASTOLIC BLOOD PRESSURE: 77 MMHG

## 2022-09-06 DIAGNOSIS — Z3A.24 24 WEEKS GESTATION OF PREGNANCY: ICD-10-CM

## 2022-09-06 DIAGNOSIS — O09.90 HIGH RISK PREGNANCY, ANTEPARTUM: ICD-10-CM

## 2022-09-06 DIAGNOSIS — R87.810 ASCUS WITH POSITIVE HIGH RISK HPV CERVICAL: ICD-10-CM

## 2022-09-06 DIAGNOSIS — R87.610 ASCUS WITH POSITIVE HIGH RISK HPV CERVICAL: ICD-10-CM

## 2022-09-06 DIAGNOSIS — Z28.310 UNVACCINATED FOR COVID-19: ICD-10-CM

## 2022-09-06 DIAGNOSIS — F11.90 OPIOID USE DISORDER: ICD-10-CM

## 2022-09-06 DIAGNOSIS — B19.20 HEPATITIS C VIRUS INFECTION WITHOUT HEPATIC COMA, UNSPECIFIED CHRONICITY: ICD-10-CM

## 2022-09-06 DIAGNOSIS — O09.90 HIGH RISK PREGNANCY, ANTEPARTUM: Primary | ICD-10-CM

## 2022-09-06 LAB
ABSOLUTE EOS #: 0.08 K/UL (ref 0–0.44)
ABSOLUTE IMMATURE GRANULOCYTE: 0.06 K/UL (ref 0–0.3)
ABSOLUTE LYMPH #: 1.93 K/UL (ref 1.1–3.7)
ABSOLUTE MONO #: 0.51 K/UL (ref 0.1–1.2)
BASOPHILS # BLD: 0 % (ref 0–2)
BASOPHILS ABSOLUTE: <0.03 K/UL (ref 0–0.2)
EOSINOPHILS RELATIVE PERCENT: 1 % (ref 1–4)
HCT VFR BLD CALC: 31.7 % (ref 36.3–47.1)
HEMOGLOBIN: 10.3 G/DL (ref 11.9–15.1)
IMMATURE GRANULOCYTES: 1 %
LYMPHOCYTES # BLD: 24 % (ref 24–43)
MCH RBC QN AUTO: 28.1 PG (ref 25.2–33.5)
MCHC RBC AUTO-ENTMCNC: 32.5 G/DL (ref 28.4–34.8)
MCV RBC AUTO: 86.6 FL (ref 82.6–102.9)
MONOCYTES # BLD: 6 % (ref 3–12)
NRBC AUTOMATED: 0 PER 100 WBC
PDW BLD-RTO: 14.2 % (ref 11.8–14.4)
PLATELET # BLD: 242 K/UL (ref 138–453)
PMV BLD AUTO: 11.7 FL (ref 8.1–13.5)
RBC # BLD: 3.66 M/UL (ref 3.95–5.11)
SEG NEUTROPHILS: 68 % (ref 36–65)
SEGMENTED NEUTROPHILS ABSOLUTE COUNT: 5.54 K/UL (ref 1.5–8.1)
WBC # BLD: 8.1 K/UL (ref 3.5–11.3)

## 2022-09-06 PROCEDURE — 99213 OFFICE O/P EST LOW 20 MIN: CPT

## 2022-09-06 PROCEDURE — G8428 CUR MEDS NOT DOCUMENT: HCPCS

## 2022-09-06 PROCEDURE — 1036F TOBACCO NON-USER: CPT

## 2022-09-06 PROCEDURE — G8417 CALC BMI ABV UP PARAM F/U: HCPCS

## 2022-09-06 NOTE — PROGRESS NOTES
Prenatal Visit    Ja Demarco is a 27 y.o. female [id number] at 24w2d    The patient was seen and evaluated. She complains of nothing today. She reports positive fetal movements. She denies contractions, vaginal bleeding and leakage of fluid. Signs and symptoms of labor and pre-eclampsia were reviewed with the patient in detail. She is to report any of these if they occur. She currently denies signs or symptoms of pre-eclampsia including headache, vision changes, RUQ pain. The patient declined the COVID-19 vaccine this year. The problem list reflects the active issues addressed during today's visit    Vitals:  BP: 122/77  Weight: 228 lb (103.4 kg)  Heart Rate: 89  Patient Position: Sitting  Albumin: Negative  Glucose: Negative  Fundal Height (cm): 24 cm  Fetal HR: 155  Movement: Present     Labs: The patient is Rh positive, Rhogam not indicated  ABO/Rh   Date Value Ref Range Status   2022 O POSITIVE  Final     1 hour GTT: ordered today    UA C&S: ordered today    Assessment & Plan:  Ja Demarco is a 27 y.o. female [id number] at 24w2d   - 28 week labs ordered, including (CBC, 1 hr GTT, U/A C&S), the patient is to complete this in the next 1-4 weeks. - An 18-22 week anatomy ultrasound has been scheduled. Patient has first MFM appointment on 22   - NIPT was ordered and low risk   - COVID-19 vaccination: R/B/A discussed with increased risk of both maternal and fetal morbidity and mortality in unvaccinated pregnant patients who contract COVID-19- patient declined today   - Aspirin indication: not indicated   - Indications for  section:  NA    Hep C   - Positive screening in pregnancy    - Hep C quant was 269,000   - Hepatic function panel ordered but not yet completed. Advised patient to have lab drawn with her 24-28 week labs   - Recommend GI referral postpartum.  See previous attending physician documentation on this matter on 8/10/22    Alpha thalassemia   - Patient is a silent carrier on carrier screening   - Fetus low risk    Abnl PAP   - ASCUS, + other HRHPV and HPV 18   - Patient has colpo scheduled on 22    Patient Active Problem List    Diagnosis Date Noted    High-risk pregnancy 2022     Priority: Medium     Risks: Opioid use disorder; daily e cigarette use; overweight; unvaccinated for COVID-19    Fetal testing indicated: No  Fetal testing indication: N/A  Fetal testing initiation: N/A  Fetal testing frequency: N/A      Alpha thalassemia silent carrier 2022     Low fetal risk based on sgNIPT      ASCUS with positive high risk HPV cervical 2022     Positive HPV 18. Plan: colposcopy       Hepatitis C antibody test positive 2022     Check Hep C quant RNA      Opioid use disorder 2022     IV heroin, clean 3/28/21. Currently in treatment at [facility]. However, they have initiated her discharge, as she has completed the program. No MAT.    22: Declines participation in [facility] at this time. Unvaccinated for covid-19 2022      Pt counseled on the the risks of not getting vaccinated in pregnancy against COVID-19. Pregnant women with symptomatic covid have a 70% increased risk of death. Covid-19 during pregnancy  increases the risk for adverse outcomes, including  birth and admission of the baby to an intensive care unit. Electronic cigarette use 2022     Vapes nicotine daily. Maternal and fetal risks reviewed. Patient verbalizes understanding. Overweight (BMI 25.0-29.9) 2022     Pregravid BMI 29.75       Return in about 4 weeks (around 10/4/2022) for STEVE Nowak MD  Ob/Gyn Resident  [facility], [facility]  2022, 7:32 PM

## 2022-09-07 LAB
CULTURE: NORMAL
SPECIMEN DESCRIPTION: NORMAL

## 2022-09-08 NOTE — PROGRESS NOTES
Attending Physician Statement  I have discussed the care of Chris Naik, including pertinent history and exam findings,  with the resident. I have reviewed the key elements of all parts of the encounter with the resident. I agree with the assessment, plan and orders as documented by the resident.   (GE Modifier)    Electronically signed by Tiffanie Mathis MD at 9:31 AM 9/8/22

## 2022-09-13 ENCOUNTER — ROUTINE PRENATAL (OUTPATIENT)
Dept: PERINATAL CARE | Age: 32
End: 2022-09-13
Payer: MEDICAID

## 2022-09-13 ENCOUNTER — HOSPITAL ENCOUNTER (OUTPATIENT)
Age: 32
Setting detail: SPECIMEN
Discharge: HOME | End: 2022-09-13
Payer: MEDICAID

## 2022-09-13 VITALS
BODY MASS INDEX: 36.1 KG/M2 | WEIGHT: 230 LBS | DIASTOLIC BLOOD PRESSURE: 68 MMHG | RESPIRATION RATE: 16 BRPM | TEMPERATURE: 97.5 F | SYSTOLIC BLOOD PRESSURE: 127 MMHG | HEIGHT: 67 IN | HEART RATE: 88 BPM

## 2022-09-13 DIAGNOSIS — F19.20 DRUG DEPENDENCE DURING PREGNANCY IN SECOND TRIMESTER (HCC): ICD-10-CM

## 2022-09-13 DIAGNOSIS — O99.322 DRUG DEPENDENCE DURING PREGNANCY IN SECOND TRIMESTER (HCC): ICD-10-CM

## 2022-09-13 DIAGNOSIS — O35.2XX0 HEREDITARY FAMILIAL DISEASE AFFECTING MANAGEMENT OF MOTHER AND POSSIBLY AFFECTING FETUS, ANTEPARTUM, SINGLE OR UNSPECIFIED FETUS: Primary | ICD-10-CM

## 2022-09-13 DIAGNOSIS — O28.5 ABNORMAL GENETIC TEST DURING PREGNANCY: ICD-10-CM

## 2022-09-13 DIAGNOSIS — O98.412 VIRAL HEPATITIS AFFECTING PREGNANCY IN SECOND TRIMESTER: ICD-10-CM

## 2022-09-13 DIAGNOSIS — O99.212 OBESITY AFFECTING PREGNANCY IN SECOND TRIMESTER: ICD-10-CM

## 2022-09-13 DIAGNOSIS — Z3A.25 25 WEEKS GESTATION OF PREGNANCY: ICD-10-CM

## 2022-09-13 DIAGNOSIS — O09.32 INSUFFICIENT PRENATAL CARE IN SECOND TRIMESTER: ICD-10-CM

## 2022-09-13 LAB
ABDOMINAL CIRCUMFERENCE: NORMAL
BIPARIETAL DIAMETER: NORMAL
ESTIMATED FETAL WEIGHT: NORMAL
FEMORAL DIAMETER: NORMAL
HC/AC: NORMAL
HEAD CIRCUMFERENCE: NORMAL

## 2022-09-13 PROCEDURE — G8427 DOCREV CUR MEDS BY ELIG CLIN: HCPCS | Performed by: OBSTETRICS & GYNECOLOGY

## 2022-09-13 PROCEDURE — 76811 OB US DETAILED SNGL FETUS: CPT | Performed by: OBSTETRICS & GYNECOLOGY

## 2022-09-13 PROCEDURE — 99244 OFF/OP CNSLTJ NEW/EST MOD 40: CPT | Performed by: OBSTETRICS & GYNECOLOGY

## 2022-09-13 PROCEDURE — 36415 COLL VENOUS BLD VENIPUNCTURE: CPT

## 2022-09-13 PROCEDURE — G8417 CALC BMI ABV UP PARAM F/U: HCPCS | Performed by: OBSTETRICS & GYNECOLOGY

## 2022-09-14 LAB
SEND OUT REPORT: NORMAL
TEST NAME: NORMAL

## 2022-09-16 ENCOUNTER — PROCEDURE VISIT (OUTPATIENT)
Dept: OBGYN | Age: 32
End: 2022-09-16
Payer: MEDICAID

## 2022-09-16 VITALS
BODY MASS INDEX: 36.49 KG/M2 | HEART RATE: 112 BPM | WEIGHT: 233 LBS | SYSTOLIC BLOOD PRESSURE: 120 MMHG | DIASTOLIC BLOOD PRESSURE: 78 MMHG

## 2022-09-16 DIAGNOSIS — Z3A.25 25 WEEKS GESTATION OF PREGNANCY: Primary | ICD-10-CM

## 2022-09-16 DIAGNOSIS — R87.610 ASCUS OF CERVIX WITH NEGATIVE HIGH RISK HPV: ICD-10-CM

## 2022-09-16 DIAGNOSIS — R87.610 ASCUS WITH POSITIVE HIGH RISK HPV CERVICAL: ICD-10-CM

## 2022-09-16 DIAGNOSIS — R87.810 ASCUS WITH POSITIVE HIGH RISK HPV CERVICAL: ICD-10-CM

## 2022-09-16 PROCEDURE — 99211 OFF/OP EST MAY X REQ PHY/QHP: CPT | Performed by: STUDENT IN AN ORGANIZED HEALTH CARE EDUCATION/TRAINING PROGRAM

## 2022-09-16 PROCEDURE — 57452 EXAM OF CERVIX W/SCOPE: CPT | Performed by: STUDENT IN AN ORGANIZED HEALTH CARE EDUCATION/TRAINING PROGRAM

## 2022-09-16 NOTE — PROGRESS NOTES
Attending Physician Statement  I have discussed the care of Angie Burgos, including pertinent history and exam findings,  with the resident. I have seen and examined the patient and the key elements of all parts of the encounter have been performed by me. I agree with the assessment, plan and orders as documented by the resident.   (GC Modifier)

## 2022-10-04 ENCOUNTER — ROUTINE PRENATAL (OUTPATIENT)
Dept: OBGYN | Age: 32
End: 2022-10-04
Payer: MEDICAID

## 2022-10-04 VITALS
HEART RATE: 99 BPM | RESPIRATION RATE: 16 BRPM | WEIGHT: 240 LBS | HEIGHT: 67 IN | SYSTOLIC BLOOD PRESSURE: 124 MMHG | DIASTOLIC BLOOD PRESSURE: 77 MMHG | BODY MASS INDEX: 37.67 KG/M2 | OXYGEN SATURATION: 99 %

## 2022-10-04 DIAGNOSIS — R87.810 ASCUS WITH POSITIVE HIGH RISK HPV CERVICAL: ICD-10-CM

## 2022-10-04 DIAGNOSIS — R87.610 ASCUS WITH POSITIVE HIGH RISK HPV CERVICAL: ICD-10-CM

## 2022-10-04 DIAGNOSIS — Z3A.28 28 WEEKS GESTATION OF PREGNANCY: Primary | ICD-10-CM

## 2022-10-04 DIAGNOSIS — O09.93 HIGH-RISK PREGNANCY IN THIRD TRIMESTER: ICD-10-CM

## 2022-10-04 PROCEDURE — 99213 OFFICE O/P EST LOW 20 MIN: CPT

## 2022-10-04 PROCEDURE — G8427 DOCREV CUR MEDS BY ELIG CLIN: HCPCS

## 2022-10-04 PROCEDURE — 90715 TDAP VACCINE 7 YRS/> IM: CPT | Performed by: OBSTETRICS & GYNECOLOGY

## 2022-10-04 PROCEDURE — G8484 FLU IMMUNIZE NO ADMIN: HCPCS

## 2022-10-04 PROCEDURE — 1036F TOBACCO NON-USER: CPT

## 2022-10-04 PROCEDURE — G8417 CALC BMI ABV UP PARAM F/U: HCPCS

## 2022-10-04 NOTE — PROGRESS NOTES
Prenatal Visit    Darnell Sanabria is a 34 y.o. female [id number] at 28w2d    The patient was seen and evaluated. She has no complaints at this time. She reports positive fetal movements. She denies contractions, vaginal bleeding and leakage of fluid. Signs and symptoms of labor and pre-eclampsia were reviewed with the patient in detail. She is to report any of these if they occur. She currently denies any of these. The patient is Rh positive and Rhogam is not indicated in this pregnancy. The patient is requesting the T-Dap Vaccine (27-36 weeks) this pregnancy. The patient declined the influenza vaccine this year. The patient declined the COVID-19 vaccine this year. The problem list reflects the active issues addressed during today's visit    Vitals:  BP: 124/77  Weight: 240 lb (108.9 kg)  Heart Rate: 99  Patient Position: Sitting  Albumin: Negative  Glucose: Negative  Fundal Height (cm): 28 cm  Fetal HR: 148  Movement: Present     28 week labs:  1hr GTT: Patient states she completed, no lab result   28 week CBC:   Lab Results   Component Value Date    WBC 8.1 2022    HGB 10.3 (L) 2022    HCT 31.7 (L) 2022    MCV 86.6 2022     2022     UA w/ Ur C&S: negative on 22     Assessment & Plan:  Darnell Sanabria is a 34 y.o. female [id number] at 34w4d   - 28 week labs completed   - Tdap vaccination: is requesting    - Influenza vaccination: declined    - Rhogam: is not indicated in this pregnancy    - Patient encouraged to complete 1 Hr GTT   - LFT order printed   - Indications for  section: No indications at this time   - Warning signs reviewed and recommendations when to call or present to the hospital if she experiences signs or symptoms of  labor and pre-eclampsia were reviewed. - The patient was instructed on fetal kick counts.  She was instructed that the baby should move at a minimum of ten times within one hour after a meal. The patient was instructed to lay down on her left side twenty minutes after eating and count movements for up to one hour with a target value of ten movements. She was instructed to notify the office if she did not make that target after two attempts or if after any attempt there was less than four movements. Patient Active Problem List    Diagnosis Date Noted    High-risk pregnancy 2022     Priority: Medium     Risks: Opioid use disorder; daily e cigarette use; overweight; unvaccinated for COVID-19    Fetal testing indicated: No  Fetal testing indication: N/A  Fetal testing initiation: N/A  Fetal testing frequency: N/A      Alpha thalassemia silent carrier 2022     Low fetal risk based on sgNIPT      ASCUS with positive high risk HPV cervical 2022     Positive HPV 18. Plan: colposcopy       Hepatitis C antibody test positive 2022     Check Hep C quant RNA      Opioid use disorder 2022     IV heroin, clean 3/28/21. Currently in treatment at [facility]. However, they have initiated her discharge, as she has completed the program. No MAT.    22: Declines participation in [facility] at this time. Unvaccinated for covid-19 2022      Pt counseled on the the risks of not getting vaccinated in pregnancy against COVID-19. Pregnant women with symptomatic covid have a 70% increased risk of death. Covid-19 during pregnancy  increases the risk for adverse outcomes, including  birth and admission of the baby to an intensive care unit. Electronic cigarette use 2022     Vapes nicotine daily. Maternal and fetal risks reviewed. Patient verbalizes understanding.       Overweight (BMI 25.0-29.9) 2022     Pregravid BMI 29.75       Return in about 2 weeks (around 10/18/2022) for Prenatal.    Greg Bledsoe MD  Ob/Gyn Resident  [facility], [id number]  10/4/2022, 11:56 AM          Attending Physician Statement  I have discussed the care of Jeanette Saba including pertinent history and exam findings,  with the resident. I have seen and examined the patient and the key elements of all parts of the encounter have been performed by me. I agree with the assessment, plan and orders as documented by the resident.   [facility] Modifier)    Delphine Lai DO

## 2022-10-06 ENCOUNTER — HOSPITAL ENCOUNTER (OUTPATIENT)
Age: 32
Setting detail: SPECIMEN
Discharge: HOME | End: 2022-10-06

## 2022-10-06 DIAGNOSIS — O09.90 HIGH RISK PREGNANCY, ANTEPARTUM: ICD-10-CM

## 2022-10-07 LAB
ALBUMIN SERPL-MCNC: 3.4 G/DL (ref 3.5–5.2)
ALBUMIN/GLOBULIN RATIO: 1 (ref 1–2.5)
ALP BLD-CCNC: 66 U/L (ref 35–104)
ALT SERPL-CCNC: 38 U/L (ref 5–33)
AST SERPL-CCNC: 37 U/L
BILIRUB SERPL-MCNC: 0.2 MG/DL (ref 0.3–1.2)
BILIRUBIN DIRECT: <0.1 MG/DL
BILIRUBIN, INDIRECT: ABNORMAL MG/DL (ref 0–1)
GLUCOSE ADMINISTRATION: NORMAL
GLUCOSE TOLERANCE SCREEN 50G: 77 MG/DL (ref 70–135)
TOTAL PROTEIN: 6.8 G/DL (ref 6.4–8.3)

## 2022-10-18 ENCOUNTER — ROUTINE PRENATAL (OUTPATIENT)
Dept: OBGYN | Age: 32
End: 2022-10-18
Payer: MEDICAID

## 2022-10-18 VITALS
DIASTOLIC BLOOD PRESSURE: 81 MMHG | WEIGHT: 242 LBS | SYSTOLIC BLOOD PRESSURE: 122 MMHG | BODY MASS INDEX: 37.9 KG/M2 | HEART RATE: 101 BPM

## 2022-10-18 DIAGNOSIS — Z3A.30 30 WEEKS GESTATION OF PREGNANCY: Primary | ICD-10-CM

## 2022-10-18 PROCEDURE — G8427 DOCREV CUR MEDS BY ELIG CLIN: HCPCS

## 2022-10-18 PROCEDURE — G8484 FLU IMMUNIZE NO ADMIN: HCPCS

## 2022-10-18 PROCEDURE — 99213 OFFICE O/P EST LOW 20 MIN: CPT

## 2022-10-18 PROCEDURE — G8417 CALC BMI ABV UP PARAM F/U: HCPCS

## 2022-10-18 PROCEDURE — 1036F TOBACCO NON-USER: CPT

## 2022-10-18 RX ORDER — LANOLIN ALCOHOL/MO/W.PET/CERES
325 CREAM (GRAM) TOPICAL 2 TIMES DAILY
Qty: 90 TABLET | Refills: 3 | Status: SHIPPED | OUTPATIENT
Start: 2022-10-18

## 2022-10-18 NOTE — PROGRESS NOTES
Prenatal Visit    Zari Milton is a 34 y.o. female [id number] at 30w2d    The patient was seen and evaluated. She presents for routine obstetrics appointment. She reports positive fetal movements. She denies contractions, vaginal bleeding and leakage of fluid. Signs and symptoms of labor and pre-eclampsia were reviewed with the patient in detail. She is to report any of these if they occur. She currently denies any of these. The patient is Rh positive and Rhogam is not indicated in this pregnancy. The patient already received  the T-Dap Vaccine (27-36 weeks) this pregnancy. The patient declined the influenza vaccine this year. The problem list reflects the active issues addressed during today's visit    Vitals:    BP: 122/81  Weight: 242 lb (109.8 kg)  Heart Rate: (!) 101  Fundal Height (cm): 30 cm  Fetal HR: 156  Movement: Present     28 week labs: .  1hr GTT: 77   28 week CBC:   Lab Results   Component Value Date    WBC 8.1 2022    HGB 10.3 (L) 2022    HCT 31.7 (L) 2022    MCV 86.6 2022     2022     UA w/ Ur C&S: negative     Assessment & Plan:  Zari Milton is a 34 y.o. female [id number] at 30w2d   - 28 week labs completed   - Hgb 10.3, Iron called into patient's pharmacy   - Tdap vaccination: is requesting    - Influenza vaccination: declined    - Rhogam: is not indicated in this pregnancy    -  testing indication starting 32 weeks GA: Not indicated   -Indications for  section: Not indicated at this time   - Warning signs reviewed and recommendations when to call or present to the hospital if she experiences signs or symptoms of  labor and pre-eclampsia were reviewed. - The patient was instructed on fetal kick counts.  She was instructed that the baby should move at a minimum of ten times within one hour after a meal. The patient was instructed to lay down on her left side twenty minutes after eating and count movements for up to one hour with a target value of ten movements. She was instructed to notify the office if she did not make that target after two attempts or if after any attempt there was less than four movements. Patient Active Problem List    Diagnosis Date Noted    High-risk pregnancy 2022     Priority: Medium     Risks: Opioid use disorder; daily e cigarette use; overweight; unvaccinated for COVID-19    Fetal testing indicated: No  Fetal testing indication: N/A  Fetal testing initiation: N/A  Fetal testing frequency: N/A      Alpha thalassemia silent carrier 2022     Low fetal risk based on sgNIPT      ASCUS with positive high risk HPV cervical 2022     Positive HPV 18. Colposcopy done 22 - AW changes and decreased lugol's uptake at 11 o'clock - no biopsies taken  Will need colposcopy at 6 wks PP      Hepatitis C antibody test positive 2022     Hep C quant RNA was 269,000 on 8/10/22  ALT/AST 38/37 on 10/6/22      Opioid use disorder 2022     IV heroin, clean 3/28/21. Currently in treatment at [facility]. However, they have initiated her discharge, as she has completed the program. No MAT.    22: Declines participation in [facility] at this time. Unvaccinated for covid-19 2022      Pt counseled on the the risks of not getting vaccinated in pregnancy against COVID-19. Pregnant women with symptomatic covid have a 70% increased risk of death. Covid-19 during pregnancy  increases the risk for adverse outcomes, including  birth and admission of the baby to an intensive care unit. Electronic cigarette use 2022     Vapes nicotine daily. Maternal and fetal risks reviewed. Patient verbalizes understanding. Overweight (BMI 25.0-29.9) 2022     Pregravid BMI 29.75       Return in about 2 weeks (around 2022) for Routine OB.     Jignesh Rai MD  Ob/Gyn Resident  [facility]  10/18/2022, 11:04 AM      Attending Physician Statement  I have personally discussed the care of Evan Conner, including pertinent history and exam findings with the resident. I have reviewed and edited their note in the electronic medical record as indicated. The key elements of all parts of the encounter have been performed/reviewed by me. I agree with the assessment, plan and orders as documented by the resident.        Attending's Name:  Gene Phelps MD

## 2022-11-01 ENCOUNTER — HOSPITAL ENCOUNTER (OUTPATIENT)
Age: 32
Setting detail: SPECIMEN
Discharge: HOME | End: 2022-11-01

## 2022-11-01 ENCOUNTER — ROUTINE PRENATAL (OUTPATIENT)
Dept: OBGYN | Age: 32
End: 2022-11-01
Payer: MEDICAID

## 2022-11-01 VITALS
HEART RATE: 92 BPM | WEIGHT: 244 LBS | SYSTOLIC BLOOD PRESSURE: 117 MMHG | DIASTOLIC BLOOD PRESSURE: 76 MMHG | BODY MASS INDEX: 38.22 KG/M2

## 2022-11-01 DIAGNOSIS — Z28.310 UNVACCINATED FOR COVID-19: ICD-10-CM

## 2022-11-01 DIAGNOSIS — N89.8 VAGINAL DISCHARGE DURING PREGNANCY IN THIRD TRIMESTER: ICD-10-CM

## 2022-11-01 DIAGNOSIS — Z3A.32 32 WEEKS GESTATION OF PREGNANCY: ICD-10-CM

## 2022-11-01 DIAGNOSIS — O09.93 HIGH-RISK PREGNANCY IN THIRD TRIMESTER: ICD-10-CM

## 2022-11-01 DIAGNOSIS — O26.893 VAGINAL DISCHARGE DURING PREGNANCY IN THIRD TRIMESTER: ICD-10-CM

## 2022-11-01 DIAGNOSIS — R76.8 HEPATITIS C ANTIBODY TEST POSITIVE: ICD-10-CM

## 2022-11-01 DIAGNOSIS — N89.8 VAGINAL DISCHARGE: ICD-10-CM

## 2022-11-01 DIAGNOSIS — E66.3 OVERWEIGHT (BMI 25.0-29.9): ICD-10-CM

## 2022-11-01 DIAGNOSIS — F11.90 OPIOID USE DISORDER: Primary | ICD-10-CM

## 2022-11-01 DIAGNOSIS — R87.610 ASCUS WITH POSITIVE HIGH RISK HPV CERVICAL: ICD-10-CM

## 2022-11-01 DIAGNOSIS — D56.3 ALPHA THALASSEMIA SILENT CARRIER: ICD-10-CM

## 2022-11-01 DIAGNOSIS — R87.810 ASCUS WITH POSITIVE HIGH RISK HPV CERVICAL: ICD-10-CM

## 2022-11-01 DIAGNOSIS — Z78.9 ELECTRONIC CIGARETTE USE: ICD-10-CM

## 2022-11-01 PROCEDURE — 1036F TOBACCO NON-USER: CPT | Performed by: STUDENT IN AN ORGANIZED HEALTH CARE EDUCATION/TRAINING PROGRAM

## 2022-11-01 PROCEDURE — G8427 DOCREV CUR MEDS BY ELIG CLIN: HCPCS | Performed by: STUDENT IN AN ORGANIZED HEALTH CARE EDUCATION/TRAINING PROGRAM

## 2022-11-01 PROCEDURE — 99213 OFFICE O/P EST LOW 20 MIN: CPT | Performed by: STUDENT IN AN ORGANIZED HEALTH CARE EDUCATION/TRAINING PROGRAM

## 2022-11-01 PROCEDURE — G8484 FLU IMMUNIZE NO ADMIN: HCPCS | Performed by: STUDENT IN AN ORGANIZED HEALTH CARE EDUCATION/TRAINING PROGRAM

## 2022-11-01 PROCEDURE — G8417 CALC BMI ABV UP PARAM F/U: HCPCS | Performed by: STUDENT IN AN ORGANIZED HEALTH CARE EDUCATION/TRAINING PROGRAM

## 2022-11-01 NOTE — PROGRESS NOTES
Prenatal Visit    Ayanna Walls is a 34 y.o. female [id number] at [place] [address]    The patient was seen and evaluated. She complains of vaginal discharge. States her partner has noticed difference in her discharge as well as he is having pain with intercourse and she would like tested. She reports positive fetal movements. She denies contractions, vaginal bleeding and leakage of fluid. Signs and symptoms of labor and pre-eclampsia were reviewed with the patient in detail. She is to report any of these if they occur. She currently denies any of these. The patient is Rh positive and Rhogam is not indicated in this pregnancy and informed the patient,   The patient already received  the T-Dap Vaccine (27-36 weeks) this pregnancy. The patient declined the influenza vaccine this year. The patient declined the COVID-19 vaccine this year. The problem list reflects the active issues addressed during today's visit    Vitals:  BP: 117/76  Weight: 244 lb (110.7 kg)  Heart Rate: 92  Patient Position: Sitting  Albumin: Negative  Glucose: Negative  Fundal Height (cm): 33 cm  Fetal HR: 148  Movement: Present     28 week labs: .  1hr GTT: 77   28 week CBC: 10.3 on 22  Lab Results   Component Value Date    WBC 8.1 2022    HGB 10.3 (L) 2022    HCT 31.7 (L) 2022    MCV 86.6 2022     2022     UA w/ Ur C&S: negative 22     Assessment & Plan:  Ayanna Walls is a 34 y.o. female [id number] at 32w2d   - 29 week labs completed   - Tdap vaccination: already received     - Influenza vaccination: declined    - Rhogam: is not indicated in this pregnancy    - COVID-19 vaccination: R/B/A discussed with increased risk of both maternal and fetal morbidity and mortality in unvaccinated pregnant patients who contract COVID-19- patient declined today   -  testing indication starting 32 weeks GA: None   -Indications for  section:  None   - Warning signs reviewed and recommendations when to call or present to the hospital if she experiences signs or symptoms of  labor and pre-eclampsia were reviewed. - The patient was instructed on fetal kick counts. She was instructed that the baby should move at a minimum of ten times within one hour after a meal. The patient was instructed to lay down on her left side twenty minutes after eating and count movements for up to one hour with a target value of ten movements. She was instructed to notify the office if she did not make that target after two attempts or if after any attempt there was less than four movements. Hepatitis C   - Liver enzymes elevated on 10/6/22   - Repeat hepatic panel at 36 weeks     Vaginal discharge   - Patient c/o increased vaginal discharge, also states partner is complaining of pain with intercourse   - vaginitis and Gc/C collected today   - On SS exam no lesions present    - will follow with results     Patient Active Problem List    Diagnosis Date Noted    APA 2022     Priority: Medium     Risks: Opioid use disorder; daily e cigarette use; overweight; unvaccinated for COVID-19    Fetal testing indicated: No  Fetal testing indication: N/A  Fetal testing initiation: N/A  Fetal testing frequency: N/A      Alpha thalassemia silent carrier 2022     Low fetal risk based on sgNIPT      ASCUS with positive high risk HPV cervical 2022     Positive HPV 18. Colposcopy done 22 - AW changes and decreased lugol's uptake at 11 o'clock - no biopsies taken  Will need colposcopy at 6 wks PP      Hepatitis C antibody test positive 2022     Hep C quant RNA was 269,000 on 8/10/22  ALT/AST 38/37 on 10/6/22  Repeat Liver enzymes @ 36 weeks      Opioid use disorder 2022     IV heroin, clean 3/28/21. Currently in treatment at [facility]. However, they have initiated her discharge, as she has completed the program. No MAT.    22: Declines participation in [employer] at this time.       Unvaccinated for covid-19 2022      Pt counseled on the the risks of not getting vaccinated in pregnancy against COVID-19. Pregnant women with symptomatic covid have a 70% increased risk of death. Covid-19 during pregnancy  increases the risk for adverse outcomes, including  birth and admission of the baby to an intensive care unit. Electronic cigarette use 2022     Vapes nicotine daily. Maternal and fetal risks reviewed. Patient verbalizes understanding. Overweight (BMI 25.0-29.9) 2022     Pregravid BMI 29.75       Return in about 2 weeks (around 11/15/2022) for ROB. Paulla Romberg, DO  Ob/Gyn Resident  [facility], [place]  2022, 2:29 PM

## 2022-11-02 DIAGNOSIS — N76.0 BACTERIAL VAGINOSIS: Primary | ICD-10-CM

## 2022-11-02 DIAGNOSIS — B96.89 BACTERIAL VAGINOSIS: Primary | ICD-10-CM

## 2022-11-02 LAB
C TRACH DNA GENITAL QL NAA+PROBE: NEGATIVE
CANDIDA SPECIES, DNA PROBE: NEGATIVE
GARDNERELLA VAGINALIS, DNA PROBE: POSITIVE
N. GONORRHOEAE DNA: NEGATIVE
SOURCE: ABNORMAL
SPECIMEN DESCRIPTION: NORMAL
TRICHOMONAS VAGINALIS DNA: NEGATIVE

## 2022-11-02 RX ORDER — METRONIDAZOLE 500 MG/1
500 TABLET ORAL 2 TIMES DAILY
Qty: 14 TABLET | Refills: 0 | Status: SHIPPED | OUTPATIENT
Start: 2022-11-02 | End: 2022-11-09

## 2022-11-02 NOTE — RESULT ENCOUNTER NOTE
STD testing negative. Positive for BV will send Flagyl to pharmacy. Please refrain from Alcohol while on this medication.

## 2022-11-02 NOTE — PROGRESS NOTES
Attending Physician Statement  I have discussed the care of Rima Patino, including pertinent history and exam findings,  with the resident. I have reviewed the key elements of all parts of the encounter with the resident. I agree with the assessment, plan and orders as documented by the resident.   (GE Modifier)    Electronically signed by Yoly Berrios MD  11/2/2022  11:32 AM

## 2022-11-08 ENCOUNTER — ROUTINE PRENATAL (OUTPATIENT)
Dept: PERINATAL CARE | Age: 32
End: 2022-11-08
Payer: MEDICAID

## 2022-11-08 VITALS
HEIGHT: 67 IN | RESPIRATION RATE: 16 BRPM | BODY MASS INDEX: 39.55 KG/M2 | DIASTOLIC BLOOD PRESSURE: 65 MMHG | SYSTOLIC BLOOD PRESSURE: 122 MMHG | HEART RATE: 90 BPM | WEIGHT: 252 LBS | TEMPERATURE: 97.3 F

## 2022-11-08 DIAGNOSIS — O99.213 OBESITY AFFECTING PREGNANCY IN THIRD TRIMESTER: ICD-10-CM

## 2022-11-08 DIAGNOSIS — Z36.4 ULTRASOUND FOR ANTENATAL SCREENING FOR FETAL GROWTH RESTRICTION: ICD-10-CM

## 2022-11-08 DIAGNOSIS — O35.2XX0 HEREDITARY FAMILIAL DISEASE AFFECTING MANAGEMENT OF MOTHER AND POSSIBLY AFFECTING FETUS, ANTEPARTUM, SINGLE OR UNSPECIFIED FETUS: ICD-10-CM

## 2022-11-08 DIAGNOSIS — Z13.89 ENCOUNTER FOR ROUTINE SCREENING FOR MALFORMATION USING ULTRASONICS: ICD-10-CM

## 2022-11-08 DIAGNOSIS — O99.323 DRUG DEPENDENCE DURING PREGNANCY IN THIRD TRIMESTER (HCC): Primary | ICD-10-CM

## 2022-11-08 DIAGNOSIS — O28.5 ABNORMAL GENETIC TEST DURING PREGNANCY: ICD-10-CM

## 2022-11-08 DIAGNOSIS — Z3A.33 33 WEEKS GESTATION OF PREGNANCY: ICD-10-CM

## 2022-11-08 DIAGNOSIS — O98.413 VIRAL HEPATITIS AFFECTING PREGNANCY IN THIRD TRIMESTER: ICD-10-CM

## 2022-11-08 DIAGNOSIS — F19.20 DRUG DEPENDENCE DURING PREGNANCY IN THIRD TRIMESTER (HCC): Primary | ICD-10-CM

## 2022-11-08 PROCEDURE — 76819 FETAL BIOPHYS PROFIL W/O NST: CPT | Performed by: OBSTETRICS & GYNECOLOGY

## 2022-11-08 PROCEDURE — 76805 OB US >/= 14 WKS SNGL FETUS: CPT | Performed by: OBSTETRICS & GYNECOLOGY

## 2022-11-08 PROCEDURE — 99999 PR OFFICE/OUTPT VISIT,PROCEDURE ONLY: CPT | Performed by: OBSTETRICS & GYNECOLOGY

## 2022-11-29 ENCOUNTER — HOSPITAL ENCOUNTER (OUTPATIENT)
Age: 32
Discharge: HOME | End: 2022-11-29
Attending: OBSTETRICS & GYNECOLOGY | Admitting: OBSTETRICS & GYNECOLOGY
Payer: MEDICAID

## 2022-11-29 ENCOUNTER — ROUTINE PRENATAL (OUTPATIENT)
Dept: OBGYN | Age: 32
End: 2022-11-29
Payer: MEDICAID

## 2022-11-29 VITALS
DIASTOLIC BLOOD PRESSURE: 82 MMHG | WEIGHT: 252 LBS | BODY MASS INDEX: 39.47 KG/M2 | SYSTOLIC BLOOD PRESSURE: 128 MMHG | HEART RATE: 97 BPM

## 2022-11-29 VITALS
DIASTOLIC BLOOD PRESSURE: 76 MMHG | RESPIRATION RATE: 17 BRPM | TEMPERATURE: 98.2 F | SYSTOLIC BLOOD PRESSURE: 125 MMHG | HEART RATE: 85 BPM

## 2022-11-29 DIAGNOSIS — Z3A.36 36 WEEKS GESTATION OF PREGNANCY: Primary | ICD-10-CM

## 2022-11-29 DIAGNOSIS — F11.90 OPIOID USE DISORDER: ICD-10-CM

## 2022-11-29 DIAGNOSIS — L29.9 GENERALIZED PRURITUS: ICD-10-CM

## 2022-11-29 LAB
ABSOLUTE EOS #: 0.04 K/UL (ref 0–0.44)
ABSOLUTE IMMATURE GRANULOCYTE: 0.09 K/UL (ref 0–0.3)
ABSOLUTE LYMPH #: 1.82 K/UL (ref 1.1–3.7)
ABSOLUTE MONO #: 0.54 K/UL (ref 0.1–1.2)
ALBUMIN SERPL-MCNC: 3.3 G/DL (ref 3.5–5.2)
ALBUMIN/GLOBULIN RATIO: 1 (ref 1–2.5)
ALP BLD-CCNC: 123 U/L (ref 35–104)
ALT SERPL-CCNC: 35 U/L (ref 5–33)
ANION GAP SERPL CALCULATED.3IONS-SCNC: 15 MMOL/L (ref 9–17)
AST SERPL-CCNC: 34 U/L
BASOPHILS # BLD: 0 % (ref 0–2)
BASOPHILS ABSOLUTE: <0.03 K/UL (ref 0–0.2)
BILIRUB SERPL-MCNC: 0.2 MG/DL (ref 0.3–1.2)
BUN BLDV-MCNC: 6 MG/DL (ref 6–20)
CALCIUM SERPL-MCNC: 8.8 MG/DL (ref 8.6–10.4)
CHLORIDE BLD-SCNC: 100 MMOL/L (ref 98–107)
CO2: 19 MMOL/L (ref 20–31)
CREAT SERPL-MCNC: 0.5 MG/DL (ref 0.5–0.9)
EOSINOPHILS RELATIVE PERCENT: 1 % (ref 1–4)
GFR SERPL CREATININE-BSD FRML MDRD: >60 ML/MIN/1.73M2
GLUCOSE BLD-MCNC: 79 MG/DL (ref 70–99)
HCT VFR BLD CALC: 36.8 % (ref 36.3–47.1)
HEMOGLOBIN: 11.3 G/DL (ref 11.9–15.1)
IMMATURE GRANULOCYTES: 1 %
LYMPHOCYTES # BLD: 24 % (ref 24–43)
MCH RBC QN AUTO: 26.8 PG (ref 25.2–33.5)
MCHC RBC AUTO-ENTMCNC: 30.7 G/DL (ref 28.4–34.8)
MCV RBC AUTO: 87.4 FL (ref 82.6–102.9)
MONOCYTES # BLD: 7 % (ref 3–12)
NRBC AUTOMATED: 0 PER 100 WBC
PDW BLD-RTO: 14.8 % (ref 11.8–14.4)
PLATELET # BLD: 270 K/UL (ref 138–453)
PMV BLD AUTO: 11.1 FL (ref 8.1–13.5)
POTASSIUM SERPL-SCNC: 3.6 MMOL/L (ref 3.7–5.3)
RBC # BLD: 4.21 M/UL (ref 3.95–5.11)
RBC # BLD: ABNORMAL 10*6/UL
SEG NEUTROPHILS: 67 % (ref 36–65)
SEGMENTED NEUTROPHILS ABSOLUTE COUNT: 5.11 K/UL (ref 1.5–8.1)
SODIUM BLD-SCNC: 134 MMOL/L (ref 135–144)
TOTAL PROTEIN: 6.5 G/DL (ref 6.4–8.3)
WBC # BLD: 7.6 K/UL (ref 3.5–11.3)

## 2022-11-29 PROCEDURE — 99213 OFFICE O/P EST LOW 20 MIN: CPT

## 2022-11-29 PROCEDURE — 96372 THER/PROPH/DIAG INJ SC/IM: CPT

## 2022-11-29 PROCEDURE — 87081 CULTURE SCREEN ONLY: CPT

## 2022-11-29 PROCEDURE — 85025 COMPLETE CBC W/AUTO DIFF WBC: CPT

## 2022-11-29 PROCEDURE — 82239 BILE ACIDS TOTAL: CPT

## 2022-11-29 PROCEDURE — 6360000002 HC RX W HCPCS: Performed by: STUDENT IN AN ORGANIZED HEALTH CARE EDUCATION/TRAINING PROGRAM

## 2022-11-29 PROCEDURE — 99211 OFF/OP EST MAY X REQ PHY/QHP: CPT

## 2022-11-29 PROCEDURE — 36415 COLL VENOUS BLD VENIPUNCTURE: CPT

## 2022-11-29 PROCEDURE — 80053 COMPREHEN METABOLIC PANEL: CPT

## 2022-11-29 RX ORDER — ACETAMINOPHEN 500 MG
1000 TABLET ORAL EVERY 6 HOURS PRN
Status: DISCONTINUED | OUTPATIENT
Start: 2022-11-29 | End: 2022-11-29 | Stop reason: HOSPADM

## 2022-11-29 RX ORDER — ONDANSETRON 2 MG/ML
4 INJECTION INTRAMUSCULAR; INTRAVENOUS EVERY 6 HOURS PRN
Status: DISCONTINUED | OUTPATIENT
Start: 2022-11-29 | End: 2022-11-29 | Stop reason: HOSPADM

## 2022-11-29 RX ORDER — ONDANSETRON 4 MG/1
4 TABLET, ORALLY DISINTEGRATING ORAL EVERY 8 HOURS PRN
Status: DISCONTINUED | OUTPATIENT
Start: 2022-11-29 | End: 2022-11-29 | Stop reason: HOSPADM

## 2022-11-29 RX ORDER — BETAMETHASONE SODIUM PHOSPHATE AND BETAMETHASONE ACETATE 3; 3 MG/ML; MG/ML
12 INJECTION, SUSPENSION INTRA-ARTICULAR; INTRALESIONAL; INTRAMUSCULAR; SOFT TISSUE ONCE
Status: COMPLETED | OUTPATIENT
Start: 2022-11-29 | End: 2022-11-29

## 2022-11-29 RX ADMIN — BETAMETHASONE SODIUM PHOSPHATE AND BETAMETHASONE ACETATE 12 MG: 3; 3 INJECTION, SUSPENSION INTRA-ARTICULAR; INTRALESIONAL; INTRAMUSCULAR at 20:09

## 2022-11-29 NOTE — H&P
OBSTETRICAL HISTORY [facility]    Date: 2022       Time: 5:09 PM   Patient Name: Iraida Whaley     Patient : 1990  Room/Bed: [place] [address]    Admission Date/Time: 2022  5:00 PM    CC: Itching x 1 month     HPI: Iraida Whaley is a 34 y.o. [id number] at 36w2d who presents for itching of back and legs x1 month. She states that itching is not constant and is intermittent. Patient was seen at her OB appointment today and was sent to L&D due to concern for ICP. Patient denies any fever, chills, N/V, headaches, vision changes, chest pain, shortness of breath, RUQ pain, abdominal pain, and increased swelling/tenderness in bilateral lower extremities. Patient denies any vaginal discharge and any urinary complaints. The patient reports fetal movement is present, denies contractions, denies loss of fluid, denies vaginal bleeding. DATING:  LMP: Patient's last menstrual period was 2022. Estimated Date of Delivery: 22   Based on: US, at 16 2/7 weeks GA    PREGNANCY RISK FACTORS:  Patient Active Problem List   Diagnosis    APA    Opioid use disorder    Unvaccinated for covid-19    Electronic cigarette use    Overweight (BMI 25.0-29. 9)    Hepatitis C antibody test positive    ASCUS with positive high risk HPV cervical    Alpha thalassemia silent carrier      Steroids Given In This Pregnancy:  no     REVIEW OF SYSTEMS:  Constitutional: negative fever, negative chills  HEENT: negative visual disturbances, negative headaches  Respiratory: negative dyspnea, negative cough  Cardiovascular: negative chest pain,  negative palpitations  Gastrointestinal: negative abdominal pain, negative RUQ pain, negative N/V, negative diarrhea, negative constipation  Genitourinary: negative dysuria, negative vaginal discharge, negative vaginal bleeding  Dermatological: negative rash, + itching and excoriations on upper extremities  Hematologic: negative bruising  Immunologic/Lymphatic: negative recent illness, negative recent sick contact  Musculoskeletal: negative back pain, negative myalgias, negative arthralgias  Neurological:  negative dizziness, negative weakness  Behavior/Psych: negative depression, negative anxiety    OBSTETRIC HISTORY:   OB History    Para Term  AB Living   8 4 4 0 3 4   SAB IAB Ectopic Molar Multiple Live Births   0 3 0 0 0 4      # Outcome Date GA Lbr Valdo/2nd Weight Sex Delivery Anes PTL Lv   8 Current            7 IAB            6 Term 12    F Vag-Spont   VISHAL   5 IAB            4 Term 12/03/10    F Vag-Spont   VISHAL   3 Term 09    F Vag-Spont   VISHAL   2 IAB 2009           1 Term 05/15/07    F Vag-Spont   VISHAL      Obstetric Comments   FOB #1 -- G1   FOB #2 -- G2-G7   FOB #3 -- G8       PAST MEDICAL HISTORY:   has a past medical history of ASCUS with positive high risk HPV cervical and Trauma. PAST SURGICAL HISTORY:   has no past surgical history on file. ALLERGIES:  has No Known Allergies. MEDICATIONS:  Prior to Admission medications    Medication Sig Start Date End Date Taking? Authorizing Provider   ferrous sulfate (FE TABS) 325 (65 Fe) MG EC tablet Take 1 tablet by mouth 2 times daily 10/18/22   Hillary Shirley MD   aspirin EC 81 MG EC tablet Take 1 tablet by mouth in the morning. 22   Bindu العلي MD   Prenatal Vit-Fe Fumarate-FA (PRENATAL VITAMIN) 27-0.8 MG TABS Take 1 tablet by mouth daily May substitute with any prenatal vitamin covered by the patient's insurance.  22   Bindu العلي MD   ondansetron (ZOFRAN ODT) 4 MG disintegrating tablet Take 1 tablet by mouth every 8 hours as needed for Nausea  Patient not taking: No sig reported 3/9/20   Pily Owen MD     FAMILY HISTORY:  family history includes Diabetes in her maternal grandfather; Hypertension in her mother; Kidney Disease in her father; No Known Problems in her half-brother, half-sister, maternal grandmother, paternal grandfather, paternal grandmother, and sister. SOCIAL HISTORY:   reports that she quit smoking about 22 months ago. Her smoking use included cigarettes. She has a 10.00 pack-year smoking history. She has never been exposed to tobacco smoke. She has never used smokeless tobacco. She reports that she does not currently use alcohol. She reports that she does not currently use drugs after having used the following drugs: IV and Heroin. VITALS:  Vitals:    11/29/22 1722   BP: 125/76   Pulse: 85   Resp: 17   Temp: 98.2 °F (36.8 °C)   TempSrc: Oral     PHYSICAL EXAM:  Fetal Heart Monitor:  Baseline Heart Rate 140, moderate variability, present accelerations, absent decelerations  [place]: rare contractions    General appearance:  no apparent distress, alert, and cooperative  HEENT: head atraumatic, normocephalic, moist mucous membranes, trachea midline  Neurologic:  alert, oriented, normal speech, no focal findings or movement disorder noted  Lungs:  No increased work of breathing, good air exchange, clear to auscultation bilaterally, no crackles or wheezing  Heart:  regular rate and rhythm and no murmur    Abdomen:  soft, gravid, and non-tender  Extremities:  no calf tenderness, non edematous   Musculoskeletal: Gross strength equal and intact throughout, no gross abnormalities, range of motion normal in hips, knees, shoulders and spine  Psychiatric: Mood appropriate, normal affect   Rectal Exam: not indicated  Pelvic Exam: not indicated    PRENATAL LAB RESULTS:  Blood Type/Rh: O pos  Antibody Screen: negative  Hemoglobin, Hematocrit, Platelets: 67.4/49.6/290  Rubella: immune  T.  Pallidum, IgG: non-reactive  Hepatitis B Surface Antigen: non-reactive   Hepatitis C Antibody: reactive   HIV: non-reactive   Gonorrhea: negative  Chlamydia: negative  Urine culture: negative, date: 9/6/22    1 hour Glucose Tolerance Test:  77    Group B Strep: unknown, collected today   Cystic Fibrosis Screen: negative  Sickle Cell Screen: negative  First Trimester Screen: not available  msAFP: negative  Non-Invasive Prenatal Testing: low risk  Anatomy US: posterior fundal placenta, 3VC, female gender, normal anatomy    ASSESSMENT & PLAN:  Tamika Daneils is a 34 y.o. female [id number] at 36w2d IUP   - GBS unknown / Rh positive / R immune   - No indication for GBS prophylaxis      Itching   - Patient states that this has been present x1 month and it has also been intermittent in nature   - Itching mainly on back and lower legs   - She has not tried anything at home   - Will order CBC, CMP and bile acids due to concern for ICP   - Also ordered initial dose of celestone today given concern for ICP. If patient discharged today, will need to return in 24 hours for repeat dose    - Will follow labs. Please see additional follow up progress note which will address labs and final plan for patient    APA   - [facility] screen negative     Hx Opioid use disorder   - Patient reports continued sobriety   - Negative UDS on 8/2022      Electronic cigarette use   - Cessation encouraged       Hepatitis C antibody test positive   - Newly diagnosed in this pregnancy   - HCV quant was 269,000 on 8/10/22  - LFTs were elevated on 10/6/22: AST/ALT were 37/38 respectively. Attributed to patient's hep C. CMP pending this admission. Will follow results   - Will need GI referral during the PP period     ASCUS with positive high risk HPV cervical   - Patient completed colpo on 9/16/22 which showed small area of acetowhite changes at 11 o'clock position. No biopsies were taken at that time.  Patient to follow up during PP period for repeat colposcopy      Alpha thalassemia silent carrier   - Fetus low risk for alpha thalassemia    BMI 39  Patient Active Problem List    Diagnosis Date Noted    APA 07/28/2022     Priority: Medium     Risks: Opioid use disorder; daily e cigarette use; overweight; unvaccinated for COVID-19    Fetal testing indicated: No  Fetal testing indication: N/A  Fetal testing initiation: N/A  Fetal testing frequency: N/A      Alpha thalassemia silent carrier 2022     Low fetal risk based on sgNIPT      ASCUS with positive high risk HPV cervical 2022     Positive HPV 18. Colposcopy done 22 - AW changes and decreased lugol's uptake at 11 o'clock - no biopsies taken  Will need colposcopy at 6 wks PP      Hepatitis C antibody test positive 2022     Hep C quant RNA was 269,000 on 8/10/22  ALT/AST 38/37 on 10/6/22  Repeat Liver enzymes @ 36 weeks      Opioid use disorder 2022     IV heroin, clean 3/28/21. Currently in treatment at [facility] [place]. However, they have initiated her discharge, as she has completed the program. No MAT.    22: Declines participation in [facility] at this time. Unvaccinated for covid-19 2022      Pt counseled on the the risks of not getting vaccinated in pregnancy against COVID-19. Pregnant women with symptomatic covid have a 70% increased risk of death. Covid-19 during pregnancy  increases the risk for adverse outcomes, including  birth and admission of the baby to an intensive care unit. Electronic cigarette use 2022     Vapes nicotine daily. Maternal and fetal risks reviewed. Patient verbalizes understanding. Overweight (BMI 25.0-29.9) 2022     Pregravid BMI 29.75       Plan discussed with Dr. Aaron Lujan, who is agreeable. Steroids given this admission: Yes, celestone x1    Risks, benefits, alternatives and possible complications have been discussed in detail with the patient. Admission, and post admission procedures and expectations were discussed in detail. All questions were answered.     Attending's Name: Dr. Nahomi Hanson MD  Ob/Gyn Resident  2022, 5:09 PM

## 2022-11-29 NOTE — PROGRESS NOTES
move at a minimum of ten times within one hour after a meal. The patient was instructed to lay down on her left side twenty minutes after eating and count movements for up to one hour with a target value of ten movements. She was instructed to notify the office if she did not make that target after two attempts or if after any attempt there was less than four movements. Generalized pruritis    -Patient reports full body itching for 1 month, which has worsened. She reports she itches in her entire body, however it is worse on her arms and legs. -Denies rash, hives, change in soap/detergent/clothing. Denies fever/chills or recent illness   -Differential diagnosis for generalized pruritus in pregnancy includes intrahepatic cholestasis of pregnancy. If lab results reveal severely increased bile acids, the patient is already within the delivery window of severe ICP and would undergo induction of labor. The patient was advised to report to [facility] at labor and delivery for lab work, including bile acids and LFTs. The patient was informed of the possibility of induction of labor and the need for Celestone for fetal lung protection.   -The patient voiced understanding and was given the opportunity to ask questions. The patient agreed to present to labor and delivery following getting her other children home.   -Labor and delivery charge nurse, attending, resident were notified   -Patient declined GBS collection, vaginitis/GC swabs at this time so she could quickly get home to her children and present to L&D    Hepatitis C    -Chronic history of hepatitis C, elevated LFTs prior to pregnancy. -Necessary to repeat LFTs at 36 weeks, however the patient is presented to labor and delivery for rule out ICP and will receive LFTs at this time.   -Patient denies upper right quadrant pain. Vaginal Discharge    -Patient reports recent worsening of vaginal discharge and vaginal irritation.   The patient has history of BV x2 in this pregnancy. The plan for today in clinic was to collect vaginitis probe, however upon giving patient the news of rule out ICP and sending her to labor and delivery she declined the exam.  If she does not undergo IOL, the labor and delivery team can test for vaginitis as they see necessary. Bacterial Vaginosis in Pregnancy    - Positive on 22 and 22    BMI     Patient Active Problem List    Diagnosis Date Noted    APA 2022     Priority: Medium     Risks: Opioid use disorder; daily e cigarette use; overweight; unvaccinated for COVID-19    Fetal testing indicated: No  Fetal testing indication: N/A  Fetal testing initiation: N/A  Fetal testing frequency: N/A      Alpha thalassemia silent carrier 2022     Low fetal risk based on sgNIPT      ASCUS with positive high risk HPV cervical 2022     Positive HPV 18. Colposcopy done 22 - AW changes and decreased lugol's uptake at 11 o'clock - no biopsies taken  Will need colposcopy at 6 wks PP      Hepatitis C antibody test positive 2022     Hep C quant RNA was 269,000 on 8/10/22  ALT/AST 38/37 on 10/6/22  Repeat Liver enzymes @ 36 weeks      Opioid use disorder 2022     IV heroin, clean 3/28/21. Currently in treatment at [facility]. However, they have initiated her discharge, as she has completed the program. No MAT.    22: Declines participation in [facility] at this time. Unvaccinated for covid-19 2022      Pt counseled on the the risks of not getting vaccinated in pregnancy against COVID-19. Pregnant women with symptomatic covid have a 70% increased risk of death. Covid-19 during pregnancy  increases the risk for adverse outcomes, including  birth and admission of the baby to an intensive care unit. Electronic cigarette use 2022     Vapes nicotine daily. Maternal and fetal risks reviewed. Patient verbalizes understanding.       Overweight (BMI 25.0-29.9) 2022 Pregravid BMI 29.75       Return in about 1 week (around 12/6/2022) for ARIELLA. Young Vitale MD  Ob/Gyn Resident  [facility], [id number]  11/29/2022, 5:04 PM       Attending Physician Statement  I have personally discussed the care of Baldo Lindsey, including pertinent history and exam findings with the resident. I have reviewed and edited their note in the electronic medical record as indicated. The key elements of all parts of the encounter have been performed/reviewed by me. I agree with the assessment, plan and orders as documented by the resident.        Attending's Name:  Davida Enriquez MD

## 2022-11-30 ENCOUNTER — HOSPITAL ENCOUNTER (OUTPATIENT)
Age: 32
Discharge: HOME | End: 2022-11-30
Attending: OBSTETRICS & GYNECOLOGY | Admitting: OBSTETRICS & GYNECOLOGY
Payer: MEDICAID

## 2022-11-30 VITALS
SYSTOLIC BLOOD PRESSURE: 137 MMHG | HEART RATE: 106 BPM | RESPIRATION RATE: 16 BRPM | TEMPERATURE: 98.2 F | DIASTOLIC BLOOD PRESSURE: 88 MMHG | OXYGEN SATURATION: 98 %

## 2022-11-30 PROCEDURE — 6360000002 HC RX W HCPCS: Performed by: STUDENT IN AN ORGANIZED HEALTH CARE EDUCATION/TRAINING PROGRAM

## 2022-11-30 PROCEDURE — 96372 THER/PROPH/DIAG INJ SC/IM: CPT

## 2022-11-30 RX ORDER — ACETAMINOPHEN 500 MG
1000 TABLET ORAL EVERY 6 HOURS PRN
Status: DISCONTINUED | OUTPATIENT
Start: 2022-11-30 | End: 2022-11-30 | Stop reason: HOSPADM

## 2022-11-30 RX ORDER — BETAMETHASONE SODIUM PHOSPHATE AND BETAMETHASONE ACETATE 3; 3 MG/ML; MG/ML
12 INJECTION, SUSPENSION INTRA-ARTICULAR; INTRALESIONAL; INTRAMUSCULAR; SOFT TISSUE ONCE
Status: COMPLETED | OUTPATIENT
Start: 2022-11-30 | End: 2022-11-30

## 2022-11-30 RX ADMIN — BETAMETHASONE SODIUM PHOSPHATE AND BETAMETHASONE ACETATE 12 MG: 3; 3 INJECTION, SUSPENSION INTRA-ARTICULAR; INTRALESIONAL; INTRAMUSCULAR at 20:08

## 2022-11-30 NOTE — PROGRESS NOTES
Obstetric/Gynecology Resident Interval Note    Had a discussion with patient regarding plan of care. Discussed ICP and risks associated. Patient is adamant that her itching is primarily on her ankles and her back and it has been ongoing and not sudden. Patient states she believes she has sensitive skin and also believes her skin has been stretching as this is the most she's ever weighed in pregnancy. Discussed R/B/A of staying for IOL versus DC home while following bile acids closely. Discussed shared decision making. Patient is comfortable with DC at this time. Will plan to collect GBS, administer celestone, follow bile acid results, and DC home. LFTs slightly elevated which is normal for this patient. Patient counseled on  labor precautions, pre-eclampsia signs and symptoms, PO hydration, and fetal kick counts. All questions and concerns were addressed and patient expressed understanding. Patient advised to follow up in the office for next appointment. Discussed with Dr. Tiana Gaucher.       Boston Medellin DO  OB/GYN Resident, PGY3  [facility]  2022, 7:48 PM

## 2022-12-01 LAB
BILE ACIDS TOTAL: 9 UMOL/L (ref 0–10)
CULTURE: ABNORMAL
SPECIMEN DESCRIPTION: ABNORMAL

## 2022-12-01 NOTE — FLOWSHEET NOTE
Pt presents to L and D, accompanied by family, via ambulatory. Pt here for steroid shot. Pt gowned and in bed, oriented to room and call light. EFM explained and applied. FHT's 158. Dr. Trina Ayala notified of admission.

## 2022-12-02 DIAGNOSIS — L29.9 ITCHING: Primary | ICD-10-CM

## 2022-12-02 RX ORDER — DIPHENHYDRAMINE HCL 25 MG
25 CAPSULE ORAL EVERY 6 HOURS
Qty: 120 CAPSULE | Refills: 1 | Status: SHIPPED | OUTPATIENT
Start: 2022-12-02 | End: 2023-01-01

## 2022-12-09 ENCOUNTER — TELEPHONE (OUTPATIENT)
Dept: OBGYN | Age: 32
End: 2022-12-09

## 2022-12-09 NOTE — TELEPHONE ENCOUNTER
Pt called in to rescheduled her prenatal visit. Pt is currently 37 weeks pregnant and is unable to come in today do to her fiance is currently sick in the ED. Soonest prenatal visit is 12/20.  Please contact pt at [phone]

## 2022-12-13 ENCOUNTER — ROUTINE PRENATAL (OUTPATIENT)
Dept: OBGYN | Age: 32
End: 2022-12-13
Payer: MEDICAID

## 2022-12-13 VITALS
BODY MASS INDEX: 39.78 KG/M2 | HEART RATE: 95 BPM | WEIGHT: 254 LBS | SYSTOLIC BLOOD PRESSURE: 134 MMHG | DIASTOLIC BLOOD PRESSURE: 84 MMHG

## 2022-12-13 DIAGNOSIS — O09.93 HIGH-RISK PREGNANCY, THIRD TRIMESTER: Primary | ICD-10-CM

## 2022-12-13 PROCEDURE — 1036F TOBACCO NON-USER: CPT | Performed by: OBSTETRICS & GYNECOLOGY

## 2022-12-13 PROCEDURE — G8417 CALC BMI ABV UP PARAM F/U: HCPCS | Performed by: OBSTETRICS & GYNECOLOGY

## 2022-12-13 PROCEDURE — 99213 OFFICE O/P EST LOW 20 MIN: CPT | Performed by: OBSTETRICS & GYNECOLOGY

## 2022-12-13 PROCEDURE — G8484 FLU IMMUNIZE NO ADMIN: HCPCS | Performed by: OBSTETRICS & GYNECOLOGY

## 2022-12-13 PROCEDURE — G8427 DOCREV CUR MEDS BY ELIG CLIN: HCPCS | Performed by: OBSTETRICS & GYNECOLOGY

## 2022-12-13 NOTE — PROGRESS NOTES
Anibal Cee 34 y.o. [id number] at 38w2d    Vitals:    12/13/22 1123   BP: (!) 138/91   Pulse: 95     Repeat /84. The patient was seen and evaluated. There was positive fetal movements. No contractions or leakage of fluid. Signs and symptoms of labor were reviewed. The S/S of Pre-Eclampsia were reviewed with the patient in detail. She is to report any of these if they occur. She currently denies any of these. She reports itching that she was experiencing at the time of her last encounter has most resolved. The patient was instructed on fetal kick counts. Allergies:  Patient has no known allergies. Group Beta Strep collection was completed. Yes    The patient was found to be GBS: positive    Cervical Exam was:   1/70/Bl      The patient was counseled on the mandatory call ahead policy. She has been instructed to call the office at anytime prior to going into the hospital so the on-call physician may direct her to the appropriate facility for care. Exceptions were reviewed including but not limited to: Decreased fetal movement, vaginal Bleeding or hemorrhage, trauma, readily expectant delivery, or any instance where she feels 911 should be utilized. Patient Active Problem List   Diagnosis    APA    Opioid use disorder    Unvaccinated for covid-19    Electronic cigarette use    Overweight (BMI 25.0-29. 9)    Hepatitis C antibody test positive    ASCUS with positive high risk HPV cervical    Alpha thalassemia silent carrier    36 weeks gestation of pregnancy    Celestone 11/29 & *     1. High-risk pregnancy, third trimester  - risk reduction IOL at 39 0/7 weeks discussed and offered to the patient. She wants to think aobut it.   - RTO 1 week. The patient will return to the office for her next visit in 1 weeks. See antepartum flow sheet.

## 2022-12-20 ENCOUNTER — ROUTINE PRENATAL (OUTPATIENT)
Dept: OBGYN | Age: 32
End: 2022-12-20
Payer: MEDICAID

## 2022-12-20 VITALS
BODY MASS INDEX: 40.22 KG/M2 | SYSTOLIC BLOOD PRESSURE: 154 MMHG | HEART RATE: 102 BPM | WEIGHT: 256.8 LBS | DIASTOLIC BLOOD PRESSURE: 104 MMHG

## 2022-12-20 DIAGNOSIS — Z3A.39 39 WEEKS GESTATION OF PREGNANCY: Primary | ICD-10-CM

## 2022-12-20 DIAGNOSIS — O13.3 GESTATIONAL HYPERTENSION, THIRD TRIMESTER: ICD-10-CM

## 2022-12-20 DIAGNOSIS — O16.3 ELEVATED BLOOD PRESSURE AFFECTING PREGNANCY IN THIRD TRIMESTER, ANTEPARTUM: ICD-10-CM

## 2022-12-20 DIAGNOSIS — Z34.93 PRENATAL CARE IN THIRD TRIMESTER: ICD-10-CM

## 2022-12-20 PROCEDURE — 99211 OFF/OP EST MAY X REQ PHY/QHP: CPT

## 2022-12-20 NOTE — PROGRESS NOTES
Prenatal Visit    Stone Macias is a 34 y.o. female [id number] at 39w2d    The patient was seen and evaluated. The patient has no complaints at this time other that stressors at home. There was positive fetal movements. She denies contractions, vaginal bleeding and leakage of fluid. Signs and symptoms of labor were reviewed. The S/S of Pre-Eclampsia were reviewed with the patient in detail. She is to report any of these if they occur. She currently denies any signs or symptoms of pre-eclampsia which include headache, vision changes, RUQ pain. Allergies:  Patient has no known allergies. Vitals and physical exam:  BP: (!) 138/93  Weight: 256 lb 12.8 oz (116.5 kg)  Heart Rate: 96  Albumin: Trace  Glucose: Negative  Fundal Height (cm): 38 cm  Fetal HR: 140  Movement: Present       Labs:  Group Beta Strep RV culture: completed, GBS +     Assessment & Plan:  Stone Macias is a 34 y.o. female [id number] at 44w2d   - GBS RV culture: collected and +, PCN in labor for GBS prophylaxis   - Tdap vaccination: 10/4/22   - Rhogam: not indicated   -  testing indication: not indicated   - Indications for  section: N/A at this time   - Warning signs of  labor, pre-eclampsia, decreased fetal movement and recommendations when to call or present to the hospital reviewed   - Route of delivery and counseling on vaginal, operative vaginal, and  sections were completed with the risks of each to both the patient as well as her baby. The possibility of a blood transfusion was discussed as well. The patient was not opposed to receiving a transfusion if needed. - The patient was counseled on types of analgesia during labor and is considering epidural.  - The patient was counseled on the need to choose her pediatrician for her baby. - The patient was counseled on postpartum plans for contraception and she is undecided    gHTN (new dx)   - Patient meeting criteria for [facility] today based on elevated BP x2. Discussed with patient that IOL is recommended at 40 weeks with the diagnosis of gHTN which the patient is 39w2d   - Discussed risk of pre-eclampsia extensively, including severe features (elevated LFTs, low platelets, risk of neurologic complications, pulmonary edema). Patient states she feels well and is just stressed and tired and is adamantly declining IOL despite recommendations. Patient counseled on risks of eclampsia, stillbirth and death. - Following multiple continued elevated Bps in the clinic (017-628 systolic), the patient agrees to present to [facility] for evaluation of elevated blood pressure and rule out preeclampsia. The patient expresses she is unsure if she will agree to an IOL 2/2 gHTN at this time, but will present to the hospital as soon as she can. Risks of not presenting to the hospital, including risks of preE were reviewed with the patient and she voiced understanding. Pruritis   - Recently admitted for w/u     - Bile acids 9    - Symptoms resolved    Hepatitis C               -Chronic history of hepatitis C, elevated LFTs prior to pregnancy              -Patient denies upper right quadrant pain. Bacterial Vaginosis in Pregnancy               - Positive on 6/16/22 and 11/1/22     BMI 40    Patient Active Problem List    Diagnosis Date Noted    36 weeks gestation of pregnancy 11/29/2022     Priority: Medium    Celestone 11/29 & * 11/29/2022     Priority: Medium     Itching present, R/o ICP   Bile acids pending *      APA 07/28/2022     Priority: Medium     Risks: Opioid use disorder; daily e cigarette use; overweight; unvaccinated for COVID-19    Fetal testing indicated: No  Fetal testing indication: N/A  Fetal testing initiation: N/A  Fetal testing frequency: N/A      Alpha thalassemia silent carrier 08/24/2022     Low fetal risk based on sgNIPT      ASCUS with positive high risk HPV cervical 08/23/2022     Positive HPV 18.    Colposcopy done 9/16/22 - AW changes and decreased lugol's uptake at 11 o'clock - no biopsies taken  Will need colposcopy at 6 wks PP      Hepatitis C antibody test positive 2022     Hep C quant RNA was 269,000 on 8/10/22  ALT/AST 38/37 on 10/6/22  Repeat Liver enzymes @ 36 weeks      Opioid use disorder 2022     IV heroin, clean 3/28/21. Currently in treatment at [facility]. However, they have initiated her discharge, as she has completed the program. No MAT.    22: Declines participation in [facility] at this time. Unvaccinated for covid-19 2022      Pt counseled on the the risks of not getting vaccinated in pregnancy against COVID-19. Pregnant women with symptomatic covid have a 70% increased risk of death. Covid-19 during pregnancy  increases the risk for adverse outcomes, including  birth and admission of the baby to an intensive care unit. Electronic cigarette use 2022     Vapes nicotine daily. Maternal and fetal risks reviewed. Patient verbalizes understanding. Overweight (BMI 25.0-29.9) 2022     Pregravid BMI 29.75       Return in about 1 week (around 2022) for STEVE Ortiz DO  Ob/Gyn Resident  [facility], [address]  2022, 3:33 PM

## 2022-12-21 ENCOUNTER — TELEPHONE (OUTPATIENT)
Dept: OBGYN | Age: 32
End: 2022-12-21

## 2022-12-21 PROBLEM — O16.3 ELEVATED BLOOD PRESSURE AFFECTING PREGNANCY IN THIRD TRIMESTER, ANTEPARTUM: Status: ACTIVE | Noted: 2022-12-21

## 2022-12-21 NOTE — TELEPHONE ENCOUNTER
Called patient to assess well-being as she never presented to the hospital as recommended yesterday evening. She states she took her blood pressure at home with her mom's cuff and it was 125/71. She denies headache, blurred vision, RUQ pain. She declines going to the hospital at this point. She is amenable to getting pre-eclampsia labs drawn. These were ordered yesterday and are in our system. She was counseled to present to the hospital immediately if any blood pressure readings at home are > 140/90 or if she develops any symptoms of pre-eclampsia. She was also instructed to call the clinic to schedule her follow up OB appt as this was not made at her visit yesterday.     Yoly Berrios MD  12/21/22  10:53 AM

## 2022-12-21 NOTE — PROGRESS NOTES
Attending Physician Statement  I have discussed the care of Wendy Peng, including pertinent history and exam findings,  with the resident. I have reviewed the key elements of all parts of the encounter with the resident. I agree with the assessment, plan and orders as documented by the resident. Discussed presentation to labor and delivery for induction secondary to gestational hypertension. Reviewed the risks of seizure and stroke. Pt states she will present to labor and delivery but is very hesitant about induction as \"nothing is wrong\", \"I've had 4 babies before and there's never been a problem\", and \"I feel fine\".  [facility] Modifier)    Electronically signed by John Boland MD  12/21/2022  11:37 AM

## 2022-12-22 ENCOUNTER — TELEPHONE (OUTPATIENT)
Dept: SOCIAL WORK | Age: 32
End: 2022-12-22

## 2022-12-22 NOTE — TELEPHONE ENCOUNTER
Mother and Child Dependency Program     Pre-Delivery Note  Pt has been active and compliant with [facility]'s Mother and Child Dependency Program since 7/19/2022., [address] client has obtained support, education, and baby items (car seat, pack n' play, diapers). Pre-Delivery Information:     Prenatal Care: Dr. Preethi Cramer, [address]     1st Prenatal Appointment: 08/8/2022      Support Services: Mother and Child Dependency, [employer]    Support System: Pt reports support from her mom and her 4 daughters as well as the FOB. FOB: Pt is planning to co-parent with the FOB. Living Situation: Pt just moved into her own apartment. Reported Substances used during pregnancy: Pt reports no substances used during pregnancy, clean date February 2021 (heroin). Relevant urinalysis results: 8/8/2022: Negative      Substance Use Treatment: Pt recently completed the aftercare program at [facility] where she received counseling and [facility] treatment. Pt reports that her daughters also attended counseling through them. Children: Pt reports that she has 4 children, all girls (11, 12, 15, 13). Pt reports that she signed over temporary custody to a family member when in active addiction, but recently got custody of all four children back from that family member. Pt reports that [facility] was called, but that parental rights were never terminated. Current CSB Involvement: Pt reports no active cases.      Negra Sifuentes ([phone])

## 2022-12-24 ENCOUNTER — ANESTHESIA (OUTPATIENT)
Dept: LABOR AND DELIVERY | Age: 32
End: 2022-12-24
Payer: MEDICAID

## 2022-12-24 ENCOUNTER — HOSPITAL ENCOUNTER (INPATIENT)
Age: 32
LOS: 1 days | Discharge: HOME | DRG: 560 | End: 2022-12-25
Attending: OBSTETRICS & GYNECOLOGY | Admitting: OBSTETRICS & GYNECOLOGY
Payer: MEDICAID

## 2022-12-24 ENCOUNTER — ANESTHESIA EVENT (OUTPATIENT)
Dept: LABOR AND DELIVERY | Age: 32
End: 2022-12-24
Payer: MEDICAID

## 2022-12-24 PROBLEM — Z97.5 IUD (INTRAUTERINE DEVICE) IN PLACE: Status: ACTIVE | Noted: 2022-12-24

## 2022-12-24 PROBLEM — Z3A.39 39 WEEKS GESTATION OF PREGNANCY: Status: ACTIVE | Noted: 2022-12-24

## 2022-12-24 PROBLEM — O09.93 HRP (HIGH RISK PREGNANCY), THIRD TRIMESTER: Status: ACTIVE | Noted: 2022-12-24

## 2022-12-24 PROBLEM — O13.9 GESTATIONAL HYPERTENSION: Status: ACTIVE | Noted: 2022-12-24

## 2022-12-24 LAB
ABO/RH: NORMAL
ABSOLUTE EOS #: 0.07 K/UL (ref 0–0.44)
ABSOLUTE IMMATURE GRANULOCYTE: 0.11 K/UL (ref 0–0.3)
ABSOLUTE LYMPH #: 1.58 K/UL (ref 1.1–3.7)
ABSOLUTE MONO #: 0.54 K/UL (ref 0.1–1.2)
AMPHETAMINE SCREEN URINE: NEGATIVE
ANTIBODY SCREEN: NEGATIVE
ARM BAND NUMBER: NORMAL
BARBITURATE SCREEN URINE: NEGATIVE
BASOPHILS # BLD: 0 % (ref 0–2)
BASOPHILS ABSOLUTE: <0.03 K/UL (ref 0–0.2)
BENZODIAZEPINE SCREEN, URINE: NEGATIVE
CANNABINOID SCREEN URINE: NEGATIVE
COCAINE METABOLITE, URINE: NEGATIVE
EOSINOPHILS RELATIVE PERCENT: 1 % (ref 1–4)
EXPIRATION DATE: NORMAL
FENTANYL URINE: NEGATIVE
HCT VFR BLD CALC: 37.3 % (ref 36.3–47.1)
HEMOGLOBIN: 11.8 G/DL (ref 11.9–15.1)
IMMATURE GRANULOCYTES: 1 %
LYMPHOCYTES # BLD: 20 % (ref 24–43)
MCH RBC QN AUTO: 26.8 PG (ref 25.2–33.5)
MCHC RBC AUTO-ENTMCNC: 31.6 G/DL (ref 28.4–34.8)
MCV RBC AUTO: 84.6 FL (ref 82.6–102.9)
METHADONE SCREEN, URINE: NEGATIVE
MONOCYTES # BLD: 7 % (ref 3–12)
NRBC AUTOMATED: 0 PER 100 WBC
OPIATES, URINE: NEGATIVE
OXYCODONE SCREEN URINE: NEGATIVE
PDW BLD-RTO: 15.7 % (ref 11.8–14.4)
PHENCYCLIDINE, URINE: NEGATIVE
PLATELET # BLD: 276 K/UL (ref 138–453)
PMV BLD AUTO: 10.9 FL (ref 8.1–13.5)
RBC # BLD: 4.41 M/UL (ref 3.95–5.11)
RBC # BLD: ABNORMAL 10*6/UL
SEG NEUTROPHILS: 71 % (ref 36–65)
SEGMENTED NEUTROPHILS ABSOLUTE COUNT: 5.72 K/UL (ref 1.5–8.1)
T. PALLIDUM, IGG: NONREACTIVE
TEST INFORMATION: NORMAL
WBC # BLD: 8 K/UL (ref 3.5–11.3)

## 2022-12-24 PROCEDURE — 1220000000 HC SEMI PRIVATE OB R&B

## 2022-12-24 PROCEDURE — 6360000002 HC RX W HCPCS: Performed by: NURSE ANESTHETIST, CERTIFIED REGISTERED

## 2022-12-24 PROCEDURE — 59409 OBSTETRICAL CARE: CPT | Performed by: OBSTETRICS & GYNECOLOGY

## 2022-12-24 PROCEDURE — 86780 TREPONEMA PALLIDUM: CPT

## 2022-12-24 PROCEDURE — 80307 DRUG TEST PRSMV CHEM ANLYZR: CPT

## 2022-12-24 PROCEDURE — 86850 RBC ANTIBODY SCREEN: CPT

## 2022-12-24 PROCEDURE — 80053 COMPREHEN METABOLIC PANEL: CPT

## 2022-12-24 PROCEDURE — 2500000003 HC RX 250 WO HCPCS: Performed by: OBSTETRICS & GYNECOLOGY

## 2022-12-24 PROCEDURE — 2500000003 HC RX 250 WO HCPCS: Performed by: NURSE ANESTHETIST, CERTIFIED REGISTERED

## 2022-12-24 PROCEDURE — 6360000002 HC RX W HCPCS

## 2022-12-24 PROCEDURE — 6360000002 HC RX W HCPCS: Performed by: STUDENT IN AN ORGANIZED HEALTH CARE EDUCATION/TRAINING PROGRAM

## 2022-12-24 PROCEDURE — 0HQ9XZZ REPAIR PERINEUM SKIN, EXTERNAL APPROACH: ICD-10-PCS | Performed by: OBSTETRICS & GYNECOLOGY

## 2022-12-24 PROCEDURE — 86900 BLOOD TYPING SEROLOGIC ABO: CPT

## 2022-12-24 PROCEDURE — 82570 ASSAY OF URINE CREATININE: CPT

## 2022-12-24 PROCEDURE — 51701 INSERT BLADDER CATHETER: CPT

## 2022-12-24 PROCEDURE — 58300 INSERT INTRAUTERINE DEVICE: CPT | Performed by: OBSTETRICS & GYNECOLOGY

## 2022-12-24 PROCEDURE — 7200000001 HC VAGINAL DELIVERY

## 2022-12-24 PROCEDURE — 99024 POSTOP FOLLOW-UP VISIT: CPT | Performed by: OBSTETRICS & GYNECOLOGY

## 2022-12-24 PROCEDURE — 2580000003 HC RX 258

## 2022-12-24 PROCEDURE — 3700000025 EPIDURAL BLOCK: Performed by: STUDENT IN AN ORGANIZED HEALTH CARE EDUCATION/TRAINING PROGRAM

## 2022-12-24 PROCEDURE — 6370000000 HC RX 637 (ALT 250 FOR IP)

## 2022-12-24 PROCEDURE — 84156 ASSAY OF PROTEIN URINE: CPT

## 2022-12-24 PROCEDURE — 0UH97HZ INSERTION OF CONTRACEPTIVE DEVICE INTO UTERUS, VIA NATURAL OR ARTIFICIAL OPENING: ICD-10-PCS | Performed by: OBSTETRICS & GYNECOLOGY

## 2022-12-24 PROCEDURE — 85025 COMPLETE CBC W/AUTO DIFF WBC: CPT

## 2022-12-24 PROCEDURE — 86901 BLOOD TYPING SEROLOGIC RH(D): CPT

## 2022-12-24 PROCEDURE — 10907ZC DRAINAGE OF AMNIOTIC FLUID, THERAPEUTIC FROM PRODUCTS OF CONCEPTION, VIA NATURAL OR ARTIFICIAL OPENING: ICD-10-PCS | Performed by: OBSTETRICS & GYNECOLOGY

## 2022-12-24 RX ORDER — HYDROCORTISONE 25 MG/G
CREAM TOPICAL
Status: DISCONTINUED | OUTPATIENT
Start: 2022-12-24 | End: 2022-12-25 | Stop reason: HOSPADM

## 2022-12-24 RX ORDER — SODIUM CHLORIDE 9 MG/ML
25 INJECTION, SOLUTION INTRAVENOUS PRN
Status: DISCONTINUED | OUTPATIENT
Start: 2022-12-24 | End: 2022-12-24

## 2022-12-24 RX ORDER — ROPIVACAINE HYDROCHLORIDE 2 MG/ML
INJECTION, SOLUTION EPIDURAL; INFILTRATION; PERINEURAL
Status: COMPLETED
Start: 2022-12-24 | End: 2022-12-24

## 2022-12-24 RX ORDER — IBUPROFEN 600 MG/1
600 TABLET ORAL EVERY 6 HOURS
Status: DISCONTINUED | OUTPATIENT
Start: 2022-12-24 | End: 2022-12-25 | Stop reason: HOSPADM

## 2022-12-24 RX ORDER — SODIUM CHLORIDE 0.9 % (FLUSH) 0.9 %
5-40 SYRINGE (ML) INJECTION EVERY 12 HOURS SCHEDULED
Status: DISCONTINUED | OUTPATIENT
Start: 2022-12-24 | End: 2022-12-25 | Stop reason: HOSPADM

## 2022-12-24 RX ORDER — ACETAMINOPHEN 500 MG
1000 TABLET ORAL EVERY 6 HOURS PRN
Status: DISCONTINUED | OUTPATIENT
Start: 2022-12-24 | End: 2022-12-24

## 2022-12-24 RX ORDER — SODIUM CHLORIDE 9 MG/ML
INJECTION, SOLUTION INTRAVENOUS PRN
Status: DISCONTINUED | OUTPATIENT
Start: 2022-12-24 | End: 2022-12-25 | Stop reason: HOSPADM

## 2022-12-24 RX ORDER — ONDANSETRON 2 MG/ML
4 INJECTION INTRAMUSCULAR; INTRAVENOUS EVERY 6 HOURS PRN
Status: DISCONTINUED | OUTPATIENT
Start: 2022-12-24 | End: 2022-12-24

## 2022-12-24 RX ORDER — SODIUM CHLORIDE, SODIUM LACTATE, POTASSIUM CHLORIDE, AND CALCIUM CHLORIDE .6; .31; .03; .02 G/100ML; G/100ML; G/100ML; G/100ML
1000 INJECTION, SOLUTION INTRAVENOUS PRN
Status: DISCONTINUED | OUTPATIENT
Start: 2022-12-24 | End: 2022-12-24

## 2022-12-24 RX ORDER — SODIUM CHLORIDE 0.9 % (FLUSH) 0.9 %
5-40 SYRINGE (ML) INJECTION PRN
Status: DISCONTINUED | OUTPATIENT
Start: 2022-12-24 | End: 2022-12-25 | Stop reason: HOSPADM

## 2022-12-24 RX ORDER — ACETAMINOPHEN 500 MG
1000 TABLET ORAL EVERY 6 HOURS PRN
Qty: 30 TABLET | Refills: 1 | Status: SHIPPED | OUTPATIENT
Start: 2022-12-24

## 2022-12-24 RX ORDER — ROPIVACAINE HYDROCHLORIDE 2 MG/ML
INJECTION, SOLUTION EPIDURAL; INFILTRATION; PERINEURAL PRN
Status: DISCONTINUED | OUTPATIENT
Start: 2022-12-24 | End: 2022-12-24 | Stop reason: SDUPTHER

## 2022-12-24 RX ORDER — SODIUM CHLORIDE 0.9 % (FLUSH) 0.9 %
5-40 SYRINGE (ML) INJECTION EVERY 12 HOURS SCHEDULED
Status: DISCONTINUED | OUTPATIENT
Start: 2022-12-24 | End: 2022-12-24

## 2022-12-24 RX ORDER — SODIUM CHLORIDE 0.9 % (FLUSH) 0.9 %
5-40 SYRINGE (ML) INJECTION PRN
Status: DISCONTINUED | OUTPATIENT
Start: 2022-12-24 | End: 2022-12-24

## 2022-12-24 RX ORDER — SIMETHICONE 80 MG
80 TABLET,CHEWABLE ORAL EVERY 6 HOURS PRN
Status: DISCONTINUED | OUTPATIENT
Start: 2022-12-24 | End: 2022-12-25 | Stop reason: HOSPADM

## 2022-12-24 RX ORDER — LIDOCAINE HYDROCHLORIDE AND EPINEPHRINE 15; 5 MG/ML; UG/ML
INJECTION, SOLUTION EPIDURAL PRN
Status: DISCONTINUED | OUTPATIENT
Start: 2022-12-24 | End: 2022-12-24 | Stop reason: SDUPTHER

## 2022-12-24 RX ORDER — NALOXONE HYDROCHLORIDE 0.4 MG/ML
INJECTION, SOLUTION INTRAMUSCULAR; INTRAVENOUS; SUBCUTANEOUS PRN
Status: DISCONTINUED | OUTPATIENT
Start: 2022-12-24 | End: 2022-12-24

## 2022-12-24 RX ORDER — EPHEDRINE SULFATE/0.9% NACL/PF 50 MG/5 ML
10 SYRINGE (ML) INTRAVENOUS EVERY 5 MIN PRN
Status: DISCONTINUED | OUTPATIENT
Start: 2022-12-24 | End: 2022-12-24

## 2022-12-24 RX ORDER — DOCUSATE SODIUM 100 MG/1
100 CAPSULE, LIQUID FILLED ORAL 2 TIMES DAILY
Status: DISCONTINUED | OUTPATIENT
Start: 2022-12-24 | End: 2022-12-25 | Stop reason: HOSPADM

## 2022-12-24 RX ORDER — SODIUM CHLORIDE, SODIUM LACTATE, POTASSIUM CHLORIDE, CALCIUM CHLORIDE 600; 310; 30; 20 MG/100ML; MG/100ML; MG/100ML; MG/100ML
INJECTION, SOLUTION INTRAVENOUS CONTINUOUS
Status: DISCONTINUED | OUTPATIENT
Start: 2022-12-24 | End: 2022-12-24

## 2022-12-24 RX ORDER — DIPHENHYDRAMINE HCL 25 MG
25 TABLET ORAL EVERY 4 HOURS PRN
Status: DISCONTINUED | OUTPATIENT
Start: 2022-12-24 | End: 2022-12-24

## 2022-12-24 RX ORDER — ONDANSETRON 2 MG/ML
4 INJECTION INTRAMUSCULAR; INTRAVENOUS EVERY 6 HOURS PRN
Status: DISCONTINUED | OUTPATIENT
Start: 2022-12-24 | End: 2022-12-25 | Stop reason: HOSPADM

## 2022-12-24 RX ORDER — EPHEDRINE SULFATE 50 MG/ML
10 INJECTION INTRAVENOUS EVERY 5 MIN PRN
Status: DISCONTINUED | OUTPATIENT
Start: 2022-12-24 | End: 2022-12-24

## 2022-12-24 RX ORDER — IBUPROFEN 600 MG/1
600 TABLET ORAL EVERY 6 HOURS PRN
Qty: 40 TABLET | Refills: 1 | Status: SHIPPED | OUTPATIENT
Start: 2022-12-24

## 2022-12-24 RX ORDER — SODIUM CHLORIDE, SODIUM LACTATE, POTASSIUM CHLORIDE, AND CALCIUM CHLORIDE .6; .31; .03; .02 G/100ML; G/100ML; G/100ML; G/100ML
500 INJECTION, SOLUTION INTRAVENOUS PRN
Status: DISCONTINUED | OUTPATIENT
Start: 2022-12-24 | End: 2022-12-24

## 2022-12-24 RX ORDER — LANOLIN 72 %
OINTMENT (GRAM) TOPICAL PRN
Status: DISCONTINUED | OUTPATIENT
Start: 2022-12-24 | End: 2022-12-25 | Stop reason: HOSPADM

## 2022-12-24 RX ORDER — ACETAMINOPHEN 500 MG
1000 TABLET ORAL EVERY 6 HOURS PRN
Status: DISCONTINUED | OUTPATIENT
Start: 2022-12-24 | End: 2022-12-25 | Stop reason: HOSPADM

## 2022-12-24 RX ORDER — DOCUSATE SODIUM 100 MG/1
100 CAPSULE, LIQUID FILLED ORAL 2 TIMES DAILY
Qty: 60 CAPSULE | Refills: 1 | Status: SHIPPED | OUTPATIENT
Start: 2022-12-24 | End: 2023-02-22

## 2022-12-24 RX ADMIN — Medication 2.5 MILLION UNITS: at 14:15

## 2022-12-24 RX ADMIN — LEVONORGESTREL 1 EACH: 52 INTRAUTERINE DEVICE INTRAUTERINE at 16:50

## 2022-12-24 RX ADMIN — Medication 1 MILLI-UNITS/MIN: at 10:15

## 2022-12-24 RX ADMIN — PENICILLIN G POTASSIUM 5 MILLION UNITS: 5000000 INJECTION, POWDER, FOR SOLUTION INTRAMUSCULAR; INTRAVENOUS at 09:50

## 2022-12-24 RX ADMIN — ROPIVACAINE HYDROCHLORIDE 12 ML/HR: 2 INJECTION, SOLUTION EPIDURAL; INFILTRATION at 13:03

## 2022-12-24 RX ADMIN — DOCUSATE SODIUM 100 MG: 100 CAPSULE ORAL at 22:31

## 2022-12-24 RX ADMIN — IBUPROFEN 600 MG: 600 TABLET, FILM COATED ORAL at 22:31

## 2022-12-24 RX ADMIN — ACETAMINOPHEN 1000 MG: 500 TABLET, FILM COATED ORAL at 22:34

## 2022-12-24 RX ADMIN — EPHEDRINE SULFATE 30 MG: 50 INJECTION, SOLUTION INTRAVENOUS at 13:01

## 2022-12-24 RX ADMIN — ROPIVACAINE HYDROCHLORIDE 3 ML: 2 INJECTION, SOLUTION EPIDURAL; INFILTRATION; PERINEURAL at 12:52

## 2022-12-24 RX ADMIN — SODIUM CHLORIDE, POTASSIUM CHLORIDE, SODIUM LACTATE AND CALCIUM CHLORIDE: 600; 310; 30; 20 INJECTION, SOLUTION INTRAVENOUS at 13:30

## 2022-12-24 RX ADMIN — Medication 166.7 ML: at 16:30

## 2022-12-24 RX ADMIN — BENZOCAINE AND LEVOMENTHOL: 200; 5 SPRAY TOPICAL at 22:31

## 2022-12-24 RX ADMIN — LIDOCAINE HYDROCHLORIDE,EPINEPHRINE BITARTRATE 5 ML: 15; .005 INJECTION, SOLUTION EPIDURAL; INFILTRATION; INTRACAUDAL; PERINEURAL at 12:43

## 2022-12-24 RX ADMIN — SODIUM CHLORIDE, POTASSIUM CHLORIDE, SODIUM LACTATE AND CALCIUM CHLORIDE: 600; 310; 30; 20 INJECTION, SOLUTION INTRAVENOUS at 09:50

## 2022-12-24 ASSESSMENT — PAIN DESCRIPTION - LOCATION
LOCATION: ABDOMEN
LOCATION: ABDOMEN

## 2022-12-24 ASSESSMENT — LIFESTYLE VARIABLES: SMOKING_STATUS: 1

## 2022-12-24 ASSESSMENT — PAIN DESCRIPTION - DESCRIPTORS
DESCRIPTORS: CRAMPING
DESCRIPTORS: CRAMPING

## 2022-12-24 ASSESSMENT — PAIN SCALES - GENERAL
PAINLEVEL_OUTOF10: 4
PAINLEVEL_OUTOF10: 4

## 2022-12-24 NOTE — L&D DELIVERY NOTE
Mother's Information      Labor Events     Labor?: No  Cervical Ripening:   Now               Alex Gore Girl Willim Fothergill [[id number]]      Labor Events     Labor?: No   Steroids?: None  Cervical Ripening Date/Time:     Antibiotics Received during Labor?: Yes  Rupture Date/Time: 22 15:44:00   Rupture Type: AROM  Fluid Color: Meconium  Meconium Consistency: Moderate  Fluid Odor: None  Fluid Volume:  Moderate  Induction: None  Augmentation: AROM, Oxytocin  Labor Complications: None       Anesthesia    Method: Epidural       Start Pushing      Labor onset date/time:   Now     Dilation complete date/time: 22 16:02:00 Now     Start pushing date/time: 2022 16:02:00   Decision date/time (emergent ):           Delivery ()      Delivery Date/Time:  22 16:24:00   Delivery Type: Vaginal, Spontaneous    Details:            [facility] Presentation    Presentation: Vertex  Position: Left  _: Occiput  _: Anterior       Shoulder Dystocia    Shoulder Dystocia Present?: No  Add Second Maneuver  Add Third Maneuver  Add Fourth Maneuver  Add Fifth Maneuver  Add Sixth Maneuver  Add Seventh Maneuver  Add Eighth Maneuver  Add Ninth Maneuver       Assisted Delivery Details    Forceps Attempted?: No  Vacuum Extractor Attempted?: No       Document Additional Attempt         Document Additional Attempt                 Cord    Vessels: 3 Vessels  Complications: None  Delayed Cord Clamping?: No  Cord Clamped Date/Time: 2022 16:24:00  Cord Blood Disposition: Lab  Gases Sent?: Yes       Placenta    Date/Time: 2022 16:28:00  Removal: Spontaneous  Appearance: Intact  Disposition: Discarded       Lacerations    Episiotomy: None  Perineal Lacerations: 1st  Other Lacerations: no non-perineal laceration  Number of Repair Packets: 1       Vaginal Counts    Initial Count Personnel: JASVIR  Initial Count Verified By: Bindu Baez [employer]   Initial Counts Correct Correct Correct   Final Counts Correct Correct Correct   Final Count Personnel: [facility]  Final Count Verified By: Shahla Cruz  Accurate Final Count?: Yes  If the count is incorrect due to Intentionally Retained Foreign Object (IRFO) add the IRFO LDA in Lines/Drains. Add LDA: Link to [facility]       Blood Loss  Mother: Sara Wells #[id number]     Start of Mother's Information      Delivery Blood Loss  22 0424 - 22 1717      Quantitative Blood Loss (mL) Hospital Encounter 300 grams    Total  300 mL               End of Mother's Information  Mother: Sara Wells #[id number]                Delivery Providers    Delivering clinician: Olimpia Caballero,      Provider Role    Barbie Buck MD Obstetrician    Марина Castaneda, RN Primary Nurse    Cee Berrios, RN Primary Steele Nurse     NICU Nurse    ERWIN Hernandez - CNP Nurse Practitioner               Assessment    Living Status: Living     Apgar Scoring Key:    0 1 2    Skin Color: Blue or pale Acrocyanotic Completely pink    Heart Rate: Absent <100 bpm >100 bpm    Reflex Irritability: No response Grimace Cry or active withdrawal    Muscle Tone: Limp Some flexion Active motion    Respiratory Effort: Absent Weak cry; hypoventilation Good, crying                      Skin Color:   Heart Rate:   Reflex Irritability:   Muscle Tone:   Respiratory Effort:    Total:            1 Minute:    0    2    2    2    2    8        Apgar 1 total from OB History    5 Minute:    1    2    2    2    2    9        Apgar 5 total from OB History    10 Minute:              15 Minute:              20 Minute:                        Apgars Assigned By: NICU              Resuscitation    Method: Bulb Suction, Stimulation             Steele Measurements      Birth Weight: 4300 g Birth Length: 0.533 m              Title      Skin to Skin Initiation Date/Time: 22 16:31:34 EST     Skin to Skin With: Mother     Skin to Skin End Date/Time: Vaginal Delivery Note  Department of Obstetrics and Gynecology  [facility]       Patient: Bjorn Rubalcava   : 1990  MRN: [id number]   Date of delivery: 22     Pre-operative Diagnosis: Bjorn Rubalcava [id number] at 39w6d  Opioid Use Disorder  Tobacco Use  Hepatitis C  ASCUS HPV 18 +  BMI 40    Post-operative Diagnosis:  same as above and  living infant female    Delivering Obstetrician & Assistant(s): Dr. Angela Jiang; Marek Vickers, PGY1    Infant Information:   Information for the patient's :  Che Gresham [[id number]]      Information for the patient's :  Alessia Mills [[id number]]        Apgar scores: 8 at 1 minute and 9 at 5 minutes. Anesthesia:  epidural anesthesia    Application and Delivery:    She was known to be GBS positive and received antibiotic prophylaxis. The patient progressed well, received an epidural, became complete and felt the urge to push. After pushing with contractions the fetal head delivered Cephalic, left occiput anterior over an intact perineum, nuchal cord was not present. The anterior, then posterior shoulder delivered easily and atraumatically followed by the rest of the infant. Nose and mouth suctioned with bulb suction, infant was stimulated and dried. Cord was clamped and cut after shortly after delivery and given to NICU NP for evaluation due to meconium noted during rupture of membranes. The delivery of the placenta was spontaneous and appeared intact. Pitocin was started. The vagina was swept of all clots and debris. The perineum and vagina were evaluated and a midline 1st degree perineal laceration was found and repaired in standard fashion with 3-0 vicryl. Mother and baby tolerated procedure well. Patient had previously consented to [address] IUD placement 2/2 her history of dysmenorrhea. The Mirena, Lot # [id number], Expiration date 2025 IUD was opened and loaded into the delivery system.  The wand was inserted just past the internal portio and the button was retracted to the first line. The wand was held in place for 10 seconds and then the button was retracted to its final position while the IUD was moved to the fundus. The string was trimmed in standard fashion. Dr. Aleksandra Queen was present for entire delivery.     Delivery Summary:       Specimen: cord blood and cord gases  Quantitative blood loss:  300ml immediately following delivery  Condition:  infant stable to general nursery and mother stable  Counts: instrument and sponge counts correct  Blood Type and Rh: O POSITIVE    Rubella Immunity Status: immune  Infant Feeding: breast feeding    Unruly Charlton MD  Ob/Gyn Resident  12/24/2022, 5:17 PM

## 2022-12-24 NOTE — H&P
OBSTETRICAL HISTORY [facility]    Date: 2022       Time: 11:58 AM   Patient Name: Darnell Sanabria     Patient : 1990  Room/Bed: [address] [place] [address]    Admission Date/Time: 2022  8:29 AM      CC: Spontaneous labor     HPI: Darnell Sanabria is a 34 y.o. [id number] at 39w6d who presents in spontaneous labor. The patient reports fetal movement is present, complains of contractions, denies loss of fluid, denies vaginal bleeding. Patient denies any headache, visual changes, difficulty breathing, RUQ pain, N/V, F/C, and pain/swelling in lower extremities. Denies any dysuria or vaginal discharge. DATING:  LMP: Patient's last menstrual period was 2022. Estimated Date of Delivery: 22   Based on: U/S, at 17 2/7 weeks GA    PREGNANCY RISK FACTORS:  Patient Active Problem List   Diagnosis    APA    Opioid use disorder    Unvaccinated for covid-19    Electronic cigarette use    Overweight (BMI 25.0-29. 9)    Hepatitis C antibody test positive    ASCUS with positive high risk HPV cervical    Alpha thalassemia silent carrier    36 weeks gestation of pregnancy    Celestone  & *    Elevated blood pressure affecting pregnancy in third trimester, antepartum    39 weeks gestation of pregnancy       REVIEW OF SYSTEMS:   Constitutional: negative fever, negative chills  HEENT: negative visual disturbances, negative headaches, negative dizziness  Breast: negative breast abnormalities, negative breast lumps, negative nipple discharge  Respiratory: negative dyspnea, negative cough, negative SOB  Cardiovascular: negative chest pain,  negative palpitations, negative arrhythmia, negative syncope   Gastrointestinal: positive abdominal pain, negative RUQ pain, negative N/V/D, negative constipation, negative bowel changes, negative heartburn   Genitourinary: negative pelvic pain, negative dysuria, negative hematuria, negative urinary incontinence, negative vaginal discharge  Dermatological: negative rash, negative pruritis  Hematologic: negative bruising  Immunologic/Lymphatic: negative recent illness, negative recent sick contact  Musculoskeletal: negative back pain, negative myalgias, negative arthralgias  Neurological:  negative migraines, negative seizures, negative weakness  Behavior/Psych: negative depression, negative anxiety, negative SI, negative HI    OBSTETRICAL HISTORY:   OB History    Para Term  AB Living   8 4 4 0 3 4   SAB IAB Ectopic Molar Multiple Live Births   0 3 0 0 0 4      # Outcome Date GA Lbr Valdo/2nd Weight Sex Delivery Anes PTL Lv   8 Current            7 IAB 2013           6 Term 12    F Vag-Spont   VISHAL   5 IAB            4 Term 12/03/10    F Vag-Spont   VISHAL   3 Term 09    F Vag-Spont   VISHAL   2 IAB            1 Term 05/15/07    F Vag-Spont   VISHAL      Obstetric Comments   FOB #1 -- G1   FOB #2 -- G2-G7   FOB #3 -- G8       PAST MEDICAL HISTORY:   has a past medical history of ASCUS with positive high risk HPV cervical and Trauma. PAST SURGICAL HISTORY:   has no past surgical history on file. ALLERGIES:  has No Known Allergies. MEDICATIONS:  Prior to Admission medications    Medication Sig Start Date End Date Taking? Authorizing Provider   diphenhydrAMINE (BENADRYL ALLERGY) 25 MG capsule Take 1 capsule by mouth in the morning and 1 capsule at noon and 1 capsule in the evening and 1 capsule before bedtime. 22  Marko Gary MD   ferrous sulfate (FE TABS) 325 (65 Fe) MG EC tablet Take 1 tablet by mouth 2 times daily 10/18/22   Brenda Haro MD   aspirin EC 81 MG EC tablet Take 1 tablet by mouth in the morning. 22   Avila Martinez MD   Prenatal Vit-Fe Fumarate-FA (PRENATAL VITAMIN) 27-0.8 MG TABS Take 1 tablet by mouth daily May substitute with any prenatal vitamin covered by the patient's insurance.  22   Avila Martinez MD   ondansetron (ZOFRAN ODT) 4 MG disintegrating tablet Take 1 tablet by mouth every 8 hours as needed for Nausea  Patient not taking: No sig reported 3/9/20   Rajesh Stone MD       FAMILY HISTORY:  family history includes Diabetes in her maternal grandfather; Hypertension in her mother; Kidney Disease in her father; No Known Problems in her half-brother, half-sister, maternal grandmother, paternal grandfather, paternal grandmother, and sister. SOCIAL HISTORY:   reports that she quit smoking about 1 years ago. Her smoking use included cigarettes. She has a 10.00 pack-year smoking history. She has never been exposed to tobacco smoke. She has never used smokeless tobacco. She reports that she does not currently use alcohol. She reports that she does not currently use drugs after having used the following drugs: IV and Heroin.     VITALS:  Vitals:    12/24/22 0847 12/24/22 0850 12/24/22 1015   BP: 119/86  126/74   Pulse: (!) 106  88   Resp: 16  16   Temp: 98.4 °F (36.9 °C)     TempSrc: Oral     SpO2: 98%     Weight:  257 lb (116.6 kg)    Height:  5' 7\" (1.702 m)          PHYSICAL EXAM:  Fetal Heart Monitor:  Baseline Heart Rate 150, moderate variability, present accelerations, absent decelerations  [place]: intermittent contractions    General appearance:  no apparent distress, alert and cooperative  HEENT: head atraumatic, normocephalic, trachea midline, moist mucous membranes   Neurologic:  oriented, normal speech, no focal findings or movement disorder noted  Lungs:  no increased work of breathing, normal chest wall rise bilaterally  Heart:  regular rate and rhythm   Abdomen:  soft, gravid, non-tender on palpation, no right upper quadrant tenderness, no CVA tenderness bilaterally, uterus non-tender, no signs of abruption and no signs of chorioamnionitis  Extremities:  no calf tenderness bilaterally, non-edematous bilaterally  Musculoskeletal: no gross abnormalities, range of motion appropriate for age   Psychiatric: mood appropriate, normal affect     Examination chaperoned by MarianaRN    Pelvic Exam:   Vulva: normal appearing vulva, no masses, tenderness or lesions, normal clitoris    Vagina: normal appearing urethral meatus, normal appearing vaginal mucosa with normal color and discharge, no lesions, no vaginal bleeding     Cervix: normal appearing cervix without pathologic appearing discharge or lesions, cervix thick and visibly closed, no cervical motion tenderness   Uterus: is gravid, normal size, shape, consistency and non-tender   Rectal Exam: not indicated     Cervix Check: 3 cm dilated, 50 % effaced, -1 station    LIMITED BEDSIDE US:  Position: Cephalic  Placental Location: posterior fundal  Fetal Heart Tones: Present  Fetal Movement: Present  Amniotic Fluid Index/Volume:  MVP 2x2cm  Estimated Fetal Weight:  7 lbs 12oz    PRENATAL LAB RESULTS:   Blood Type/Rh: O pos  Antibody Screen: negative  Hemoglobin, Hematocrit, Platelets: 84.3 / 33 / 82.1  Rubella: immune  T. Pallidum, IgG: non-reactive   Hep C: reactive   Hepatitis B Surface Antigen: non-reactive   HIV: non-reactive   Gonorrhea: negative  Chlamydia: negative  Urine culture: negative, date: 06/16/22, 09/06/22    1 hour Glucose Tolerance Test: 77    Group B Strep: positive - 11/29/22  Cystic Fibrosis Screen: negative  Sickle Cell Screen: negative  MSAFP/Multiple Markers: low risk   Non-Invasive Prenatal Testing: low risk for aneuploidy  Anatomy US: posterior vessel, 3 VC, normal female anatomy    ASSESSMENT & PLAN:  Yobani Haley is a 34 y.o. female [id number] at 39w6d IUP   - GBS positive / Rh positive / R immune   - Pen G for GBS prophylaxis   - VSS, afebrile   - admit to L&D under service of Dr. Christie Howard   - CBC, T&S, T.pal, UDS pending     - IV fluids: LR @ 125 cc/hr   - Induction method: AROM after 4 hours of Pen G   - cEFM/ TOCO; cat 1   - Diet: normal diet  - UDS ordered, Informed consent obtained. Discussed R/B/A. All questions and concerns answered. Patient verbalized understanding and agreement to plan. APA   - NIPT negative   - Follows with [facility]    Opioid Use Disorder   - UDS has been negative during pregnancy   - UDS negative on admission   - SW consult PP    Tobacco Use   - Cessation encouraged   - SW consult PP    Fibroid Uterus   - 2 cm fibroid noted on MFM scan   - Patient to have post placental IUD for dysmenorrhea   - Continue to manage sx postpartum outpatient    HCV+   - HCV RNA + noted on prenatal labs   - LFTs slightly elevated at AST/ALT 38/39   - Patient clinically asymptomatic at this time   - Patient encouraged to continue close follow up outpatient and follow with a GI doctor    ASCUS HPV+   - ASCUS and HPV 18 noted on pap smear   - Patient to follow up outpatient with OBGYN for a repeat colposcopy post partum    Alpha Thal Silent Carrier   - Noted on genetic screening through [facility]   - FOB testing unknown    Celestone 11/29 & 11/30   - Concern for ICP, patient given celestone   - Bile acids taken at that time were wnl    BMI 40    Patient Active Problem List    Diagnosis Date Noted    39 weeks gestation of pregnancy 12/24/2022     Priority: Medium    36 weeks gestation of pregnancy 11/29/2022     Priority: Medium    Celestone 11/29 & * 11/29/2022     Priority: Medium     Itching present, R/o ICP   Bile acids pending *      APA 07/28/2022     Priority: Medium     Risks: Opioid use disorder; daily e cigarette use; overweight; unvaccinated for COVID-19    Fetal testing indicated: No  Fetal testing indication: N/A  Fetal testing initiation: N/A  Fetal testing frequency: N/A      Elevated blood pressure affecting pregnancy in third trimester, antepartum 12/21/2022     Pt had elevated blood pressures at her visit on 12/13/22 (138/91) with normal repeat  She had 3 elevated blood pressures (130/90s, followed by 2 150s/100s x 2)      Alpha thalassemia silent carrier 08/24/2022     Low fetal risk based on sgNIPT      ASCUS with positive high risk HPV cervical 08/23/2022     Positive HPV 18.    Colposcopy done 22 - AW changes and decreased lugol's uptake at 11 o'clock - no biopsies taken  Will need colposcopy at 6 wks PP      Hepatitis C antibody test positive 2022     Hep C quant RNA was 269,000 on 8/10/22  ALT/AST 38/37 on 10/6/22  Repeat Liver enzymes @ 36 weeks      Opioid use disorder 2022     IV heroin, clean 3/28/21. Currently in treatment at [facility]. However, they have initiated her discharge, as she has completed the program. No MAT.    22: Declines participation in [facility] at this time. Unvaccinated for covid-19 2022      Pt counseled on the the risks of not getting vaccinated in pregnancy against COVID-19. Pregnant women with symptomatic covid have a 70% increased risk of death. Covid-19 during pregnancy  increases the risk for adverse outcomes, including  birth and admission of the baby to an intensive care unit. Electronic cigarette use 2022     Vapes nicotine daily. Maternal and fetal risks reviewed. Patient verbalizes understanding. Overweight (BMI 25.0-29.9) 2022     Pregravid BMI 29.75         Plan discussed with on-call physician Dr. Sanchez Felt, who is agreeable. Risks, benefits, alternatives and possible complications have been discussed in detail with the patient. Admission, and post admission procedures and expectations were discussed in detail. All questions were answered.     Patient's primary ob/gyn provider: Dr. Sanchez Felt     Steroids Given In This Pregnancy: yes  Steroids given this admission:  no    Rajni Yang MD  Ob/Gyn Resident  2022, 11:58 AM

## 2022-12-24 NOTE — ANESTHESIA PROCEDURE NOTES
Epidural Block    Patient location during procedure: OB  Start time: 12/24/2022 12:39 PM  End time: 12/24/2022 12:49 PM  Reason for block: labor epidural  Staffing  Performed: resident/CRNA   Anesthesiologist: Cassi Spears MD  Resident/CRNA: ERWIN Summers CRNA  Epidural  Patient position: sitting  Prep: Betadine  Patient monitoring: continuous pulse ox and frequent blood pressure checks  Approach: midline  Location: L3-4  Injection technique: SUMEET saline  Provider prep: mask and sterile gloves  Needle  Needle type: Tuohy   Needle gauge: 17 G  Needle length: 3.5 in  Needle insertion depth: 7 cm  Catheter type: side hole  Catheter size: 19 G  Catheter at skin depth: 13 cm  Test dose: negativeCatheter Secured: tegaderm and tape  Assessment  Hemodynamics: stable  Attempts: 1  Outcomes: uncomplicated and patient tolerated procedure well  Preanesthetic Checklist  Completed: patient identified, IV checked, site marked, risks and benefits discussed, surgical/procedural consents, equipment checked, pre-op evaluation, timeout performed, anesthesia consent given, oxygen available and monitors applied/VS acknowledged

## 2022-12-24 NOTE — DISCHARGE SUMMARY
Obstetric Discharge Summary  [facility]    Patient Name: Roc Palomino  Patient : 1990  Primary Care Physician: Stanislav Sage MD  Admit Date: 2022    Principal Diagnosis: IUP at 39w6d, admitted for Spontaneous Labor     Her pregnancy has been complicated by:   Patient Active Problem List   Diagnosis    APA    Opioid use disorder    Unvaccinated for covid-19    Electronic cigarette use    Overweight (BMI 25.0-29. 9)    Hepatitis C antibody test positive    ASCUS with positive high risk HPV cervical    Alpha thalassemia silent carrier    36 weeks gestation of pregnancy    Celestone  & *    Elevated blood pressure affecting pregnancy in third trimester, antepartum    39 weeks gestation of pregnancy    HRP (high risk pregnancy), third trimester     22 F Apg 8/9 Wt 9#7    Post Placental IUD 22    Gestational hypertension       Infection Present?: No  Hospital Acquired: No    Surgical Operations & Procedures:  Analgesia: epidural  Delivery Type: Spontaneous Vaginal Delivery: See Labor and Delivery Summary   Laceration(s): See L&D Summary    Consultations: NICU and Anesthesia     Pertinent Findings & Procedures:   Roc Palomino is a 34 y.o. female [id number] at 39w6d admitted for Spontaneous Labor; received Pen G, Pitocin, epidural, AROM (mec). Patient was diagnosed with gestational hypertension 22. PreE labs collected on admission and wnl, P/C 0.18    She delivered by spontaneous vaginal a Live Born infant on 22  . Mirena IUD was placed immediately after delivery. Information for the patient's :  Jessica Host Girl Nathaly Mix [[id number]]   female   Birth Weight: 9 lb 7.7 oz (4.3 kg)     Apgars: 8 at 1 minute and 9 at 5 minutes.      Postpartum course: normal.      Course of patient: uncomplicated    Discharge to: Home    Readmission planned: no     Recommendations on Discharge:     Medications:      Medication List        START taking these medications      acetaminophen 500 MG tablet  Commonly known as: TYLENOL  Take 2 tablets by mouth every 6 hours as needed for Pain     docusate sodium 100 MG capsule  Commonly known as: COLACE  Take 1 capsule by mouth 2 times daily     ibuprofen 600 MG tablet  Commonly known as: ADVIL;MOTRIN  Take 1 tablet by mouth every 6 hours as needed for Pain            CONTINUE taking these medications      aspirin EC 81 MG EC tablet  Take 1 tablet by mouth in the morning. diphenhydrAMINE 25 MG capsule  Commonly known as: Benadryl Allergy  Take 1 capsule by mouth in the morning and 1 capsule at noon and 1 capsule in the evening and 1 capsule before bedtime. ferrous sulfate 325 (65 Fe) MG EC tablet  Commonly known as: Fe Tabs  Take 1 tablet by mouth 2 times daily     ondansetron 4 MG disintegrating tablet  Commonly known as: Zofran ODT  Take 1 tablet by mouth every 8 hours as needed for Nausea     Prenatal Vitamin 27-0.8 MG Tabs  Take 1 tablet by mouth daily May substitute with any prenatal vitamin covered by the patient's insurance. Where to Get Your Medications        These medications were sent to [address] [address]      Phone: [phone]   acetaminophen 500 MG tablet  docusate sodium 100 MG capsule  ibuprofen 600 MG tablet           Activity: pelvic rest x 6 weeks, no driving on narcotics  Diet: regular diet  Follow up: 2 weeks for  postpartum appt    Condition on discharge: stable    Discharge date: 12/25/2022     Kathleen Kidd DO  Ob/Gyn Resident    Comments:  Home care and follow-up care were reviewed. Pelvic rest, and birth control were reviewed. Signs and symptoms of mastitis and post partum depression were reviewed. The patient is to notify her physician if any of these occur.  The patient was counseled on secondary smoke risks and the increased risk of sudden infant death syndrome and respiratory problems to her baby with exposure. She was counseled on various alternate recommendations to decrease the exposure to secondary smoke to her children. Date: 2022  Time: 10:10 AM      Patient Name: Maura Starr  Patient : 1990  Room/Bed: 2027/8703-57  Admission Date/Time: 2022  8:29 AM        Attending Physician Statement  I have discussed the care of Maura Starr, including pertinent history and exam findings with the resident. I have reviewed and edited their note in the electronic medical record. The key elements of all parts of the encounter have been performed/reviewed by me . I agree with the assessment, plan and orders as documented by the resident. The level of care submitted represents to the best of my ability the care documented in the medical record today. GC Modifier. This service has been performed in part by a resident under the direction of a teaching physician.         Attending's Name:  Wilma Jackson DO

## 2022-12-24 NOTE — CARE COORDINATION
ANTEPARTUM NOTE    39 weeks gestation of pregnancy [Z3A.39]    Santana Fay  was admitted to L&D on 12/24/22 for Spontaneous labor @ 39W 6D    OB GYN Provider: Dr. Tam Bates [facility]    Will meet with patient after delivery to verify name/address/phone/insurance and discuss discharge planning. Anticipate DC home 2 nights after vaginal delivery or 4 nights after C/S delivery as long as hemodynamically stable.

## 2022-12-24 NOTE — PROGRESS NOTES
Labor Progress Note    Marilou Madden is a 34 y.o. female [id number] at 39w6d  The patient was seen and examined. Her pain is well controlled. She reports fetal movement is present, denies contractions, denies loss of fluid, denies vaginal bleeding.        Patient was AROM's ([facility]) and tolerated the procedure well    Vital Signs:  Vitals:    12/24/22 1643 12/24/22 1646 12/24/22 1701 12/24/22 1715   BP: 127/69 127/63 118/83 125/73   Pulse: 96 93 87 97   Resp: 14 12 16 14   Temp:       TempSrc:       SpO2:       Weight:       Height:             FHT: 160, moderate variability, accelerations present, decelerations absent  Contractions: regular, every 2-3 minutes    Chaperone for Intimate Exam: Chaperone was present for entire exam, Chaperone Name: Maryjo CALLE  Cervical Exam: 9 cm dilated, 90 effaced, 0 station  Pitocin: @ 2 mu/min    Membranes: Ruptured meconium stained  Scalp Electrode in place: absent  Intrauterine Pressure Catheter in Place: absent    Interventions: SVE, AROM    Assessment/Plan:  Marilou Madden is a 34 y.o. female [id number] at 37w11d admitted for Spontaneous Labor   - GBS positive, Pen G for GBS prophylaxis   - VSS, afebrile   - cEFM/TOCO; cat 1   - FHT @ 160   - Epidural in place for pain control   - Pit @ 2 mu/min   - Continue pitocin per protocol   - Continue current management        Attending and Senior Resident updated and in agreement with plan    Ronan Moran MD  Ob/Gyn Resident  12/24/2022, 3:46 PM

## 2022-12-24 NOTE — FLOWSHEET NOTE
[address] at bedside. 1235positioned for epidural  1242 catheter placed. 1243 test dose given. 1252 loading dose given. 1250 positioned to low fowlers with left uterine displacement. 1253 pump initiated. BP dropped to 94/37 and then 74/30. Fluid bolus started and writer called out for meds.

## 2022-12-24 NOTE — ANESTHESIA PRE PROCEDURE
Department of Anesthesiology  Preprocedure Note       Name:  Alessandra Mensah   Age:  34 y.o.  :  1990                                          MRN:  [id number]         Date:  2022      Surgeon: Tj Thorne    Procedure: Labor Epidural    Medications prior to admission:   Prior to Admission medications    Medication Sig Start Date End Date Taking? Authorizing Provider   diphenhydrAMINE (BENADRYL ALLERGY) 25 MG capsule Take 1 capsule by mouth in the morning and 1 capsule at noon and 1 capsule in the evening and 1 capsule before bedtime. 22  Timo Chinchilla MD   ferrous sulfate (FE TABS) 325 (65 Fe) MG EC tablet Take 1 tablet by mouth 2 times daily 10/18/22   Alejandro Rust MD   aspirin EC 81 MG EC tablet Take 1 tablet by mouth in the morning. 22   Jasmina Ross MD   Prenatal Vit-Fe Fumarate-FA (PRENATAL VITAMIN) 27-0.8 MG TABS Take 1 tablet by mouth daily May substitute with any prenatal vitamin covered by the patient's insurance.  22   Jasmina Ross MD   ondansetron (ZOFRAN ODT) 4 MG disintegrating tablet Take 1 tablet by mouth every 8 hours as needed for Nausea  Patient not taking: No sig reported 3/9/20   Jason Lincoln MD       Current medications:    Current Facility-Administered Medications   Medication Dose Route Frequency Provider Last Rate Last Admin    lactated ringers infusion   IntraVENous Continuous Harrison Vazquez  mL/hr at 22 0950 New Bag at 22 0950    lactated ringers bolus  500 mL IntraVENous PRN Harrison Vazquez MD        Or    lactated ringers bolus  1,000 mL IntraVENous PRN Harrison Vazquez MD        sodium chloride flush 0.9 % injection 5-40 mL  5-40 mL IntraVENous 2 times per day Harrison Vazquez MD        sodium chloride flush 0.9 % injection 5-40 mL  5-40 mL IntraVENous PRN Harrison Vazquez MD        0.9 % sodium chloride infusion  25 mL IntraVENous PRN Harrison Vazquez MD        ondansetron [facility] injection 4 mg  4 mg IntraVENous Q6H PRN Jan Elliott MD        diphenhydrAMINE (BENADRYL) tablet 25 mg  25 mg Oral Q4H PRN Jan Elliott MD        acetaminophen (TYLENOL) tablet 1,000 mg  1,000 mg Oral Q6H PRN Jan Elliott MD        benzocaine-menthol (DERMOPLAST) 20-0.5 % spray   Topical PRN Jan Elliott MD        penicillin G potassium in d5w IVPB 2.5 Million Units  2.5 Million Units IntraVENous Q4H Jan Elliott MD        levonorgestrel [facility]) IUD 52 mg 1 each  1 each IntraUTERine Once Jan Elliott MD        oxytocin (PITOCIN) 30 units in 500 mL infusion  1-20 eusebia-units/min IntraVENous Continuous Honey Dewayne, DO 2 mL/hr at 22 1115 2 eusebia-units/min at 22 1115    ropivacaine (NAROPIN) 0.2% injection 0.2%                Allergies:  No Known Allergies    Problem List:    Patient Active Problem List   Diagnosis Code    APA O09.90    Opioid use disorder F11.90    Unvaccinated for covid-19 Z28.310    Electronic cigarette use Z78.9    Overweight (BMI 25.0-29. 9) E66.3    Hepatitis C antibody test positive R76.8    ASCUS with positive high risk HPV cervical R87.610, R87.810    Alpha thalassemia silent carrier D56.3    36 weeks gestation of pregnancy Z3A.36    Celestone  & * L29.9    Elevated blood pressure affecting pregnancy in third trimester, antepartum O16.3    39 weeks gestation of pregnancy Z3A.39       Past Medical History:        Diagnosis Date    ASCUS with positive high risk HPV cervical 2022    Trauma     Pt says \"the list goes on. I don't even know where to start. \"       Past Surgical History:  No past surgical history on file.     Social History:    Social History     Tobacco Use    Smoking status: Former     Packs/day: 1.00     Years: 10.00     Pack years: 10.00     Types: Cigarettes     Quit date:      Years since quittin.9     Passive exposure: Never    Smokeless tobacco: Never    Tobacco comments:     Vaping \"Use just like after hours.    Coags: No results found for: PROTIME, INR, APTT    HCG (If Applicable):   Lab Results   Component Value Date    PREGTESTUR NEGATIVE 2015    HCG NEGATIVE 2013        ABGs: No results found for: PHART, PO2ART, RLG7EIM, UWO3RHO, BEART, K7GQVDTV     Type & Screen (If Applicable):  No results found for: LABABO, LABRH    Drug/Infectious Status (If Applicable):  Lab Results   Component Value Date/Time    HEPCAB REACTIVE 2022 03:17 PM       COVID-19 Screening (If Applicable): No results found for: COVID19        Anesthesia Evaluation   no history of anesthetic complications (Opiod use disorder): Airway: Mallampati: II  TM distance: >3 FB   Neck ROM: full  Mouth opening: > = 3 FB   Dental:    (+) edentulous      Pulmonary:   (+) current smoker (electronic cigarette use)                           Cardiovascular:Negative CV ROS            Rhythm: regular  Rate: normal                    Neuro/Psych:   Negative Neuro/Psych ROS              GI/Hepatic/Renal:   (+) hepatitis: C,           Endo/Other:    (+) blood dyscrasia: anemia:., .                 Abdominal:             Vascular: negative vascular ROS. Other Findings:  uterus          Anesthesia Plan      epidural     ASA 2     (Platelets 997)        Anesthetic plan and risks discussed with patient. Use of blood products discussed with patient whom. Plan discussed with attending.                     ERWIN Medina CRNA   2022

## 2022-12-25 VITALS
WEIGHT: 257 LBS | BODY MASS INDEX: 40.34 KG/M2 | SYSTOLIC BLOOD PRESSURE: 121 MMHG | HEIGHT: 67 IN | OXYGEN SATURATION: 97 % | DIASTOLIC BLOOD PRESSURE: 72 MMHG | TEMPERATURE: 97.9 F | HEART RATE: 82 BPM | RESPIRATION RATE: 16 BRPM

## 2022-12-25 LAB
ALBUMIN SERPL-MCNC: 3.4 G/DL (ref 3.5–5.2)
ALBUMIN/GLOBULIN RATIO: 1.1 (ref 1–2.5)
ALP BLD-CCNC: 127 U/L (ref 35–104)
ALT SERPL-CCNC: 22 U/L (ref 5–33)
ANION GAP SERPL CALCULATED.3IONS-SCNC: 14 MMOL/L (ref 9–17)
AST SERPL-CCNC: 27 U/L
BILIRUB SERPL-MCNC: 0.3 MG/DL (ref 0.3–1.2)
BUN BLDV-MCNC: 7 MG/DL (ref 6–20)
CALCIUM SERPL-MCNC: 8.9 MG/DL (ref 8.6–10.4)
CHLORIDE BLD-SCNC: 102 MMOL/L (ref 98–107)
CO2: 19 MMOL/L (ref 20–31)
CREAT SERPL-MCNC: 0.49 MG/DL (ref 0.5–0.9)
CREATININE URINE: 107.5 MG/DL (ref 28–217)
GFR SERPL CREATININE-BSD FRML MDRD: >60 ML/MIN/1.73M2
GLUCOSE BLD-MCNC: 87 MG/DL (ref 70–99)
POTASSIUM SERPL-SCNC: 4.1 MMOL/L (ref 3.7–5.3)
SODIUM BLD-SCNC: 135 MMOL/L (ref 135–144)
TOTAL PROTEIN, URINE: 19 MG/DL
TOTAL PROTEIN: 6.5 G/DL (ref 6.4–8.3)
URINE TOTAL PROTEIN CREATININE RATIO: 0.18 (ref 0–0.2)

## 2022-12-25 PROCEDURE — 99024 POSTOP FOLLOW-UP VISIT: CPT | Performed by: OBSTETRICS & GYNECOLOGY

## 2022-12-25 PROCEDURE — 2580000003 HC RX 258

## 2022-12-25 PROCEDURE — 6370000000 HC RX 637 (ALT 250 FOR IP)

## 2022-12-25 RX ORDER — ACETAMINOPHEN 500 MG
1000 TABLET ORAL EVERY 6 HOURS PRN
Qty: 30 TABLET | Refills: 1 | Status: SHIPPED | OUTPATIENT
Start: 2022-12-25

## 2022-12-25 RX ORDER — DOCUSATE SODIUM 100 MG/1
100 CAPSULE, LIQUID FILLED ORAL 2 TIMES DAILY
Qty: 60 CAPSULE | Refills: 0 | Status: SHIPPED | OUTPATIENT
Start: 2022-12-25 | End: 2023-01-24

## 2022-12-25 RX ORDER — IBUPROFEN 600 MG/1
600 TABLET ORAL EVERY 6 HOURS PRN
Qty: 40 TABLET | Refills: 0 | Status: SHIPPED | OUTPATIENT
Start: 2022-12-25

## 2022-12-25 RX ADMIN — ACETAMINOPHEN 1000 MG: 500 TABLET, FILM COATED ORAL at 04:16

## 2022-12-25 RX ADMIN — DOCUSATE SODIUM 100 MG: 100 CAPSULE ORAL at 07:44

## 2022-12-25 RX ADMIN — IBUPROFEN 600 MG: 600 TABLET, FILM COATED ORAL at 04:16

## 2022-12-25 RX ADMIN — IBUPROFEN 600 MG: 600 TABLET, FILM COATED ORAL at 11:48

## 2022-12-25 RX ADMIN — SODIUM CHLORIDE, PRESERVATIVE FREE 10 ML: 5 INJECTION INTRAVENOUS at 07:44

## 2022-12-25 RX ADMIN — ACETAMINOPHEN 1000 MG: 500 TABLET, FILM COATED ORAL at 11:49

## 2022-12-25 ASSESSMENT — PAIN DESCRIPTION - DESCRIPTORS
DESCRIPTORS: CRAMPING;SORE
DESCRIPTORS: CRAMPING

## 2022-12-25 ASSESSMENT — PAIN SCALES - GENERAL
PAINLEVEL_OUTOF10: 3
PAINLEVEL_OUTOF10: 1
PAINLEVEL_OUTOF10: 3

## 2022-12-25 ASSESSMENT — PAIN DESCRIPTION - LOCATION
LOCATION: ABDOMEN
LOCATION: ABDOMEN

## 2022-12-25 ASSESSMENT — PAIN DESCRIPTION - ORIENTATION: ORIENTATION: LOWER

## 2022-12-25 NOTE — PROGRESS NOTES
POST PARTUM DAY # 1    Iraida Whaley is a 34 y.o. female  This patient was seen & examined today. Her pregnancy was complicated by:   Patient Active Problem List   Diagnosis    APA    Opioid use disorder    Unvaccinated for covid-19    Electronic cigarette use    Overweight (BMI 25.0-29. 9)    Hepatitis C antibody test positive    ASCUS with positive high risk HPV cervical    Alpha thalassemia silent carrier    36 weeks gestation of pregnancy    Celestone  & *    Elevated blood pressure affecting pregnancy in third trimester, antepartum    39 weeks gestation of pregnancy    HRP (high risk pregnancy), third trimester     22 F Apg 8/9 Wt 9#7    Post Placental IUD 22    Gestational hypertension       Today she is doing well without any chief complaint. Her lochia is light. She denies chest pain, shortness of breath, headache, and lightheadedness. She is  breast feeding and she denies any breast tenderness. She is ambulating well. Her voiding pattern is normal. I reviewed signs and symptoms of post partum depression with the patient, she currently denies any of these symptoms. She is tolerating solids.      Vital Signs:  Vitals:    22 1830 22 1845 22 1940 22 2358   BP: 127/75 118/69 112/65 111/68   Pulse: 95 (!) 103 92 79   Resp: 16 14 16 14   Temp:   99.5 °F (37.5 °C) 98.4 °F (36.9 °C)   TempSrc:   Oral Oral   SpO2:   97% 96%   Weight:       Height:             Physical Exam:  General:  no apparent distress, alert, and cooperative  Neurologic:  alert, oriented, normal speech, no focal findings or movement disorder noted  Lungs:  No increased work of breathing, good air exchange, clear to auscultation bilaterally, no crackles or wheezing  Heart:  regular rate and rhythm    Abdomen: abdomen soft, non-distended, non-tender  Fundus: non-tender, normal size, firm, below umbilicus  Extremities:  no calf tenderness, non edematous    Lab:  Lab Results   Component Value Date    HGB 11.8 (L) 2022     Lab Results   Component Value Date    HCT 37.3 2022       Assessment/Plan:  Ayanna Walls is a [id number] PPD # 1 s/p    - Doing well, VSS   - female infant in [address]   - Encourage ambulation   - Postpartum Hgb/Hct if sx   - Motrin/Tylenol  Rh positive/Rubella immune  Breast feeding   - S/s of mastitis reviewed  - Patient denies sx at this time  Opioid Use Disorder   - UDS negative on admission   - SW consult PP  Tobacco Use  - Cessation encouraged  - SW consult PP  Fibroid Uterus   - Continue f/u PP   - IUD for dysmenorrhea  HCV+   - Hep C reactive   - LFTs elevated   - Encourage GI follow up post partum  ASCUS HPV +   - ASCUS noted   - HPV 18+   - Patient to followup with colposcopy outpatient postpartum  Continue post partum care  BMI 40    Counseling Completed:  Secondary Smoke risks and Sudden Infant Death Syndrome were reviewed with recommendations. Infant sleeping, \"back to sleep\" and avoidance of co-sleeping recommendations were reviewed. Signs and Symptoms of Post Partum Depression were reviewed. The patient is to call if any occur. Signs and symptoms of Mastitis were reviewed. The patient is to call if any occur for follow up. Discharge instructions including pelvic rest, no driving with pain medicine and office follow-up were reviewed with patient     Attending Physician: Dr. Suri Edmonds MD  Ob/Gyn Resident   2022, 3:36 AM    Date: 2022  Time: 10:10 AM      Patient Name: Ayanna Walls  Patient : 1990  Room[facility]Bed: [facility]  Admission Date/Time: 2022  8:29 AM        Attending Physician Statement  I have discussed the care of Ayanna Walls, including pertinent history and exam findings with the resident. I have reviewed and edited their note in the electronic medical record. The key elements of all parts of the encounter have been performed/reviewed by me .   I agree with the assessment, plan and orders as documented

## 2022-12-25 NOTE — CARE COORDINATION
CASE MANAGEMENT POST-PARTUM TRANSITIONAL CARE PLAN    39 weeks gestation of pregnancy [Z3A.39]    OB Provider: [facility]    Writer met w/ Trena Day at bedside to discuss DCP. She is S/P  on 2022 of female . Writer verified name/address/phone number correct on facesheet. She states she lives with her mom and 4 kids. Trena Day verbalized no problems with transportation to and from doctors appointments or with paying for medications upon discharge home. Lalit Smyth correct. Writer notified Trena Day she has 30 days from date of birth to add  to insurance policy. Trena Day verbalized understanding. Trena Mendezclinton confirmed a safe place for infant to sleep at home. Infant name on BC: Reid [id number]. Infant PCP [facility].      DME: None  HOME CARE: None    Anticipate DC of couplet 2022    Readmission Risk              Risk of Unplanned Readmission:  7

## 2022-12-25 NOTE — FLOWSHEET NOTE
Patient admitted to room 745 from l&d via . Oriented to room and surroundings. Plan of care reviewed. Verbalized understanding. Instructed on infant security and safe sleep practices. Preventing falls education provided . The following handouts given: A New Beginning: Your Guide to Postpartum Care, Rounding, gs Security System,Babies Cry A lot, Safe Sleep, Security and Visitation Guidelines. Call light placed within reach.

## 2022-12-25 NOTE — ANESTHESIA POSTPROCEDURE EVALUATION
Department of Anesthesiology  Postprocedure Note    Patient: Ayah Bound  MRN: [id number]  YOB: 1990  Date of evaluation: 12/24/2022      Procedure Summary     Date: 12/24/22 Room / Location:     Anesthesia Start: 1224 Anesthesia Stop: 1624    Procedure: Labor Analgesia Diagnosis:     Scheduled Providers:  Responsible Provider: Yolanda Garcia MD    Anesthesia Type: epidural ASA Status: 2          Anesthesia Type: No value filed.     Joel Phase I: Joel Score: 9    Joel Phase II: Joel Score: 10      Anesthesia Post Evaluation    Patient location during evaluation: bedside  Patient participation: complete - patient participated  Level of consciousness: awake and alert  Pain score: 0  Airway patency: patent  Nausea & Vomiting: no nausea and no vomiting  Complications: no  Cardiovascular status: blood pressure returned to baseline and hemodynamically stable  Respiratory status: nonlabored ventilation and room air  Hydration status: stable

## 2022-12-25 NOTE — FLOWSHEET NOTE
Patient discharged per w/c, infant in car seat on moms lap. Patient discharged home with belongings.

## 2022-12-25 NOTE — PLAN OF CARE
Problem: Pain  Goal: Verbalizes/displays adequate comfort level or baseline comfort level  Outcome: Progressing     Problem: Postpartum  Goal: Experiences normal postpartum course  Description:  Postpartum OB-Pregnancy care plan goal which identifies if the mother is experiencing a normal postpartum course  Outcome: Progressing  Goal: Appropriate maternal -  bonding  Description:  Postpartum OB-Pregnancy care plan goal which identifies if the mother and  are bonding appropriately  Outcome: Progressing  Goal: Establishment of infant feeding pattern  Description:  Postpartum OB-Pregnancy care plan goal which identifies if the mother is establishing a feeding pattern with their   Outcome: Progressing  Goal: Incisions, wounds, or drain sites healing without S/S of infection  Outcome: Progressing     Problem: Discharge Planning  Goal: Discharge to home or other facility with appropriate resources  Outcome: Progressing

## 2022-12-25 NOTE — FLOWSHEET NOTE
Written and verbal discharge instruction given to patient, patient states understanding, scripts also given.

## 2023-01-11 ENCOUNTER — POSTPARTUM VISIT (OUTPATIENT)
Dept: OBGYN | Age: 33
End: 2023-01-11
Payer: MEDICAID

## 2023-01-11 VITALS
SYSTOLIC BLOOD PRESSURE: 122 MMHG | HEART RATE: 91 BPM | HEIGHT: 67 IN | BODY MASS INDEX: 35.66 KG/M2 | WEIGHT: 227.2 LBS | DIASTOLIC BLOOD PRESSURE: 78 MMHG

## 2023-01-11 DIAGNOSIS — R76.8 HEPATITIS C ANTIBODY TEST POSITIVE: ICD-10-CM

## 2023-01-11 PROCEDURE — 99024 POSTOP FOLLOW-UP VISIT: CPT | Performed by: OBSTETRICS & GYNECOLOGY

## 2023-01-11 PROCEDURE — 99211 OFF/OP EST MAY X REQ PHY/QHP: CPT | Performed by: OBSTETRICS & GYNECOLOGY

## 2023-01-11 NOTE — PROGRESS NOTES
Alexis Kim  2023  11:49 AM        Alexis Kmi is a 34 y.o. female [id number]      The patient was seen. She has no chief complaints today. She delivered vaginally on 22. She is not breast feeding and there is not any signs or symptoms of mastitis. The patient completed the E.P.D.S. Evaluation form and scored 0. She does not have any signs or symptoms of post partum depression. She denies any suicidal thoughts with a plan, intent to harm others, and delusional ideas. Today her lochia is light she denies any dizziness or shortness of breath.       Her pregnancy was complicated by:  Patient Active Problem List    Diagnosis Date Noted    39 weeks gestation of pregnancy 2022     Priority: Medium    HRP (high risk pregnancy), third trimester 2022     Priority: Medium     22 F Apg 8/9 Wt 9#7 2022     Priority: Medium    36 weeks gestation of pregnancy 2022     Priority: Medium    Celestone  & * 2022     Priority: Medium     Overview Note:     Itching present, R/o ICP   Bile acids pending *      APA 2022     Priority: Medium     Overview Note:     Risks: Opioid use disorder; daily e cigarette use; overweight; unvaccinated for COVID-19    Fetal testing indicated: No  Fetal testing indication: N/A  Fetal testing initiation: N/A  Fetal testing frequency: N/A      Post Placental IUD 22     Overview Note:     Lot #: [id number]  Exp: 2025      Gestational hypertension 2022     Overview Note:     Diagnosed       Elevated blood pressure affecting pregnancy in third trimester, antepartum 2022     Overview Note:     Pt had elevated blood pressures at her visit on 22 (138/91) with normal repeat  She had 3 elevated blood pressures (130/90s, followed by 2 150s/100s x 2)      Alpha thalassemia silent carrier 2022     Overview Note:     Low fetal risk based on sgNIPT      ASCUS with positive high risk HPV cervical 2022     Overview Note:     Positive HPV 18. Colposcopy done 22 - AW changes and decreased lugol's uptake at 11 o'clock - no biopsies taken  Will need colposcopy at 6 wks PP      Hepatitis C antibody test positive 2022     Overview Note:     Hep C quant RNA was 269,000 on 8/10/22  ALT/AST 38/37 on 10/6/22  Repeat Liver enzymes @ 36 weeks      Opioid use disorder 2022     Overview Note:     IV heroin, clean 3/28/21. Currently in treatment at [facility]. However, they have initiated her discharge, as she has completed the program. No MAT.    22: Declines participation in [facility] at this time. Unvaccinated for covid-19 2022     Overview Note:      Pt counseled on the the risks of not getting vaccinated in pregnancy against COVID-19. Pregnant women with symptomatic covid have a 70% increased risk of death. Covid-19 during pregnancy  increases the risk for adverse outcomes, including  birth and admission of the baby to an intensive care unit. Electronic cigarette use 2022     Overview Note:     Vapes nicotine daily. Maternal and fetal risks reviewed. Patient verbalizes understanding. Overweight (BMI 25.0-29.9) 2022     Overview Note:     Pregravid BMI 29.75           She does admit to having good home support.       OB History    Para Term  AB Living   8 5 5 0 3 5   SAB IAB Ectopic Molar Multiple Live Births   0 3 0 0 0 5      # Outcome Date GA Lbr Valdo/2nd Weight Sex Delivery Anes PTL Lv   8 Term 22 39w6d / 00:22 9 lb 7.7 oz (4.3 kg) F Vag-Spont EPI N VISHAL      Name: Cinda Toya: 8  Apgar5: 9   7 IAB            6 Term 12    F Vag-Spont   VISHAL   5 IAB            4 Term 12/03/10    F Vag-Spont   VISHAL   3 Term 09    F Vag-Spont   VISHAL   2 IAB            1 Term 05/15/07    F Vag-Spont   VISHAL      Obstetric Comments   FOB #1 -- G1   FOB #2 -- G2-G7   FOB #3 -- G8       Patient Active Problem List   Diagnosis    APA    Opioid use disorder    Unvaccinated for covid-19    Electronic cigarette use    Overweight (BMI 25.0-29. 9)    Hepatitis C antibody test positive    ASCUS with positive high risk HPV cervical    Alpha thalassemia silent carrier    36 weeks gestation of pregnancy    Celestone  & *    Elevated blood pressure affecting pregnancy in third trimester, antepartum    39 weeks gestation of pregnancy    HRP (high risk pregnancy), third trimester     22 F Apg 8/9 Wt 9#7    Post Placental IUD 22    Gestational hypertension       Blood pressure 122/78, pulse 91, height 5' 6.5\" (1.689 m), weight 227 lb 3.2 oz (103.1 kg), last menstrual period 2022, not currently breastfeeding. Abdomen: Soft and non-tender; good bowel sounds; no guarding, rebound or rigidity; no CVA tenderness bilaterally. Extremities: No calf tenderness bilaterally. DTR 2/4 bilaterally. No edema. Assessment:   Diagnosis Orders   1. Postpartum state        2.  Hepatitis C antibody test positive  Aubrey Morris MD, Gastroenterology, [facility] Complaint   Patient presents with    Postpartum Care     2 week post partum     Patient Active Problem List    Diagnosis Date Noted    39 weeks gestation of pregnancy 2022     Priority: Medium    HRP (high risk pregnancy), third trimester 2022     Priority: Medium     22 F Apg 8/9 Wt 9#7 2022     Priority: Medium    36 weeks gestation of pregnancy 2022     Priority: Medium    Celestone  & * 2022     Priority: Medium     Itching present, R/o ICP   Bile acids pending *      APA 2022     Priority: Medium     Risks: Opioid use disorder; daily e cigarette use; overweight; unvaccinated for COVID-19    Fetal testing indicated: No  Fetal testing indication: N/A  Fetal testing initiation: N/A  Fetal testing frequency: N/A      Post Placental IUD 22     Lot #: [id number]  Exp: 2025      Gestational hypertension 2022     Diagnosed       Elevated blood pressure affecting pregnancy in third trimester, antepartum 2022     Pt had elevated blood pressures at her visit on 22 (138/91) with normal repeat  She had 3 elevated blood pressures (130/90s, followed by 2 150s/100s x 2)      Alpha thalassemia silent carrier 2022     Low fetal risk based on sgNIPT      ASCUS with positive high risk HPV cervical 2022     Positive HPV 18. Colposcopy done 22 - AW changes and decreased lugol's uptake at 11 o'clock - no biopsies taken  Will need colposcopy at 6 wks PP      Hepatitis C antibody test positive 2022     Hep C quant RNA was 269,000 on 8/10/22  ALT/AST 38/37 on 10/6/22  Repeat Liver enzymes @ 36 weeks      Opioid use disorder 2022     IV heroin, clean 3/28/21. Currently in treatment at [facility]. However, they have initiated her discharge, as she has completed the program. No MAT.    22: Declines participation in [facility] at this time. Unvaccinated for covid-19 2022      Pt counseled on the the risks of not getting vaccinated in pregnancy against COVID-19. Pregnant women with symptomatic covid have a 70% increased risk of death. Covid-19 during pregnancy  increases the risk for adverse outcomes, including  birth and admission of the baby to an intensive care unit. Electronic cigarette use 2022     Vapes nicotine daily. Maternal and fetal risks reviewed. Patient verbalizes understanding. Overweight (BMI 25.0-29.9) 2022     Pregravid BMI 29.75           EPDS Score of 0        Plan:  1. Return to the office in 4 weeks  2. Signs & Symptoms of mastitis reviewed; notify if occurs  3. Secondary smoke risks reviewed. Increased risks of respiratory problems, Sudden infant death syndrome, and potential malignancies. 4. Abstinence  5.  Family planning counseling and STD counseling completed  6. Return in about 4 weeks (around 2/8/2023) for 6 wk PP visit and IUD string check. 7. Will refer pt to GI regarding Hep C    Patient was seen with total face to face time of 20 minutes. More than 50% of this visit was on counseling and education regarding her    Diagnosis Orders   1. Postpartum state        2. Hepatitis C antibody test positive  Lesa Weston MD, Gastroenterology, [facility]       and her options. She was also counseled on her preventative health maintenance recommendations and follow-up.     Guido Tremayneing, DO

## 2023-02-15 ENCOUNTER — POSTPARTUM VISIT (OUTPATIENT)
Dept: OBGYN | Age: 33
End: 2023-02-15
Payer: MEDICAID

## 2023-02-15 VITALS
HEART RATE: 84 BPM | DIASTOLIC BLOOD PRESSURE: 90 MMHG | BODY MASS INDEX: 36.41 KG/M2 | WEIGHT: 229 LBS | SYSTOLIC BLOOD PRESSURE: 132 MMHG

## 2023-02-15 PROBLEM — L29.9 ITCHING: Status: RESOLVED | Noted: 2022-11-29 | Resolved: 2023-02-15

## 2023-02-15 PROBLEM — O09.93 HRP (HIGH RISK PREGNANCY), THIRD TRIMESTER: Status: RESOLVED | Noted: 2022-12-24 | Resolved: 2023-02-15

## 2023-02-15 PROBLEM — O16.3 ELEVATED BLOOD PRESSURE AFFECTING PREGNANCY IN THIRD TRIMESTER, ANTEPARTUM: Status: RESOLVED | Noted: 2022-12-21 | Resolved: 2023-02-15

## 2023-02-15 PROBLEM — O13.9 GESTATIONAL HYPERTENSION: Status: RESOLVED | Noted: 2022-12-24 | Resolved: 2023-02-15

## 2023-02-15 PROBLEM — O09.90 HIGH-RISK PREGNANCY: Status: RESOLVED | Noted: 2022-07-28 | Resolved: 2023-02-15

## 2023-02-15 PROBLEM — Z3A.39 39 WEEKS GESTATION OF PREGNANCY: Status: RESOLVED | Noted: 2022-12-24 | Resolved: 2023-02-15

## 2023-02-15 PROBLEM — Z3A.36 36 WEEKS GESTATION OF PREGNANCY: Status: RESOLVED | Noted: 2022-11-29 | Resolved: 2023-02-15

## 2023-02-15 PROCEDURE — 90471 IMMUNIZATION ADMIN: CPT | Performed by: OBSTETRICS & GYNECOLOGY

## 2023-02-15 PROCEDURE — 99211 OFF/OP EST MAY X REQ PHY/QHP: CPT

## 2023-02-15 NOTE — PROGRESS NOTES
After obtaining consent, and per orders of  injection of  HPV-9 given in Left arm by Jace Michel MA. Patient instructed to remain in clinic for 20 minutes afterwards, and to report any adverse reaction to me immediately.

## 2023-02-15 NOTE — PROGRESS NOTES
Postpartum Visit    Ja Demarco is a 34 y.o. female [id number], 8 weeks Post Partum s/p spontaneous vaginal delivery on 23    The patient was seen. She has no chief complaint today. Her pregnancy was complicated by gestational hypertension, ASCUS, + HPV 18, APA, Hep C. She is  bottle feeding and denies signs or symptoms of mastitis. The patient completed the E.P.D.S. Post Partum Depression Evaluation form and EPDS Score of 0. She does not have signs or symptoms of post partum depression. She denies any suicidal thoughts with a plan, intent to harm others, and delusional ideas. She does admit to having good home support. Today her lochia is light spotting she denies any dizziness or shortness of breath. Her bowels are regular and she denies any urinary tract symptomology. She is using Mirena IUD for history of dysmenorrhea and condoms for STD prevention. Her pregnancy was complicated by:  Patient Active Problem List    Diagnosis Date Noted    HRP (high risk pregnancy), third trimester 2022     Priority: Medium     22 F Apg 8/ Wt 9#7 2022     Priority: Medium    APA 2022     Priority: Medium     Overview Note:     Risks: Opioid use disorder; daily e cigarette use; overweight; unvaccinated for COVID-19    Fetal testing indicated: No  Fetal testing indication: N/A  Fetal testing initiation: N/A  Fetal testing frequency: N/A      Post Placental IUD 22     Overview Note:     Lot #: [id number]  Exp: 2025      Gestational hypertension 2022     Overview Note:     Diagnosed       Alpha thalassemia silent carrier 2022     Overview Note:     Low fetal risk based on sgNIPT      ASCUS with positive high risk HPV cervical 2022     Overview Note:     Positive HPV 18.    Colposcopy done 22 - AW changes and decreased lugol's uptake at 11 o'clock - no biopsies taken  Will need colposcopy at 6 wks PP      Hepatitis C antibody test positive 2022     Overview Note:     Hep C quant RNA was 269,000 on 8/10/22  ALT/AST 38/37 on 10/6/22  Repeat Liver enzymes @ 36 weeks      Opioid use disorder 2022     Overview Note:     IV heroin, clean 3/28/21. Currently in treatment at [facility]. However, they have initiated her discharge, as she has completed the program. No MAT.    22: Declines participation in [facility] at this time. Unvaccinated for covid-19 2022     Overview Note:      Pt counseled on the the risks of not getting vaccinated in pregnancy against COVID-19. Pregnant women with symptomatic covid have a 70% increased risk of death. Covid-19 during pregnancy  increases the risk for adverse outcomes, including  birth and admission of the baby to an intensive care unit. Electronic cigarette use 2022     Overview Note:     Vapes nicotine daily. Maternal and fetal risks reviewed. Patient verbalizes understanding. Overweight (BMI 25.0-29.9) 2022     Overview Note:     Pregravid BMI 29.75         Vitals:   Blood pressure (!) 132/90, pulse 84, weight 229 lb (103.9 kg), last menstrual period 2022, not currently breastfeeding.     Physical Exam:  Chaperone for Intimate Exam: Chaperone was present for entire exam, Chaperone Name: Howie Barnes, [place]    General:  no apparent distress, alert, and cooperative  Lungs:  No increased work of breathing, good air exchange, clear to auscultation bilaterally, no crackles or wheezing  Heart:  normal S1 and S2, regular rate and rhythm, and no murmurs, rubs, gallops  Abdomen: Abdomen soft, non-tender, no rebound, guarding, rigidity, BS normal. no masses  Fundus: non-tender, normal size, firm, below umbilicus  Perineum: is normal  Incision: NA  Extremities:  no calf tenderness, non edematous, non erythematous     Assessment:  Gerard Mujica is a 34 y.o. female [id number], 8 weeks Post Partum s/p spontaneous vaginal delivery on 23   - Doing well, continue routine post partum care   - SSE performed, IUD strings visualized at external cervical os. Strings long and at vaginal introitus. Trimmed to 2 cm and tucked behind the cervix in the posterior fornix   - EPDS: 0   - Family planning: Mirena IUD   - Influenza vaccination: R/B/A of Influenza vaccination discussed and pt declined. - COVID-19 vaccination: R/B/A discussed with increased risk of both maternal and fetal morbidity and mortality in unvaccinated pregnant patients who contract COVID-19- patient declined today   - Gardisil vaccination: never received, first dose given today, patient to return in 2 months and 6 months for subsequent doses   - Last pap smear:  2022, ASCUS + HPV 18, Colpo obtained during pregnancy, no biopsies or ECC were obtained.  Will have patient return in 1-2 for Colpo with ECC   - Indications for 2hr 75g GTT:  NA    ASCUS, +HPV 18   - Colpo to be performed in 1-2 weeks    Elevated blood pressure   - Patient denies any s/s PreE   - She did have gestational hypertension in antepartum period   - Low suspicion for preeclampsia at this time as patient is 8 wks PP and outside of window   - Recommended follow up with PCP for workup for chronic hypertension and patient expressed understanding.    - Given strict return precautions    Patient Active Problem List    Diagnosis Date Noted    HRP (high risk pregnancy), third trimester 2022     Priority: Medium     22 F Apg 8/9 Wt 9#7 2022     Priority: Medium    APA 2022     Priority: Medium     Risks: Opioid use disorder; daily e cigarette use; overweight; unvaccinated for COVID-19    Fetal testing indicated: No  Fetal testing indication: N/A  Fetal testing initiation: N/A  Fetal testing frequency: N/A      Post Placental IUD 22     Lot #: [id number]  Exp: 2025      Gestational hypertension 2022     Diagnosed       Alpha thalassemia silent carrier 2022     Low fetal risk based on sgNIPT      ASCUS with positive high risk HPV cervical 2022     Positive HPV 18. Colposcopy done 22 - AW changes and decreased lugol's uptake at 11 o'clock - no biopsies taken  Will need colposcopy at 6 wks PP      Hepatitis C antibody test positive 2022     Hep C quant RNA was 269,000 on 8/10/22  ALT/AST 38/37 on 10/6/22  Repeat Liver enzymes @ 36 weeks      Opioid use disorder 2022     IV heroin, clean 3/28/21. Currently in treatment at [facility]. However, they have initiated her discharge, as she has completed the program. No MAT.    22: Declines participation in [facility] at this time. Unvaccinated for covid-19 2022      Pt counseled on the the risks of not getting vaccinated in pregnancy against COVID-19. Pregnant women with symptomatic covid have a 70% increased risk of death. Covid-19 during pregnancy  increases the risk for adverse outcomes, including  birth and admission of the baby to an intensive care unit. Electronic cigarette use 2022     Vapes nicotine daily. Maternal and fetal risks reviewed. Patient verbalizes understanding. Overweight (BMI 25.0-29.9) 2022     Pregravid BMI 29.75       Return in about 2 weeks (around 3/1/2023) for Colposcopy. Counseling Completed:  Signs & Symptoms of mastitis and when to notify office discussed.   Secondary smoke risks including increased risks of respiratory problems, Sudden infant death syndrome, and potential malignancies discussed  Abstinence, family planning counseling and STD counseling discussed  No restrictions to heavy lifting or [place]     Orval MD Tk  Ob/Gyn Resident  [facility], [facility]  2/15/2023, 5:30 PM

## 2023-02-17 ENCOUNTER — TELEPHONE (OUTPATIENT)
Dept: SOCIAL WORK | Age: 33
End: 2023-02-17

## 2023-02-17 NOTE — TELEPHONE ENCOUNTER
Mother and Child Dependency Program    Update    Sw and Pt continue to meet once a month. Pt had her baby on 12/24/22. Baby is home and doing well. Pt recently moved into her own place, and all 5 of her daughters reside with her. Pt is planning on getting a job sometime soon, family members will watch the baby while she works. Sw and Pt are working on a few ongoing resources such as furniture assistance and diaper assistance. Expected discharge is April, 2023.

## 2023-03-01 ENCOUNTER — HOSPITAL ENCOUNTER (OUTPATIENT)
Age: 33
Setting detail: SPECIMEN
Discharge: HOME | End: 2023-03-01

## 2023-03-01 ENCOUNTER — PROCEDURE VISIT (OUTPATIENT)
Dept: OBGYN | Age: 33
End: 2023-03-01
Payer: MEDICAID

## 2023-03-01 VITALS
BODY MASS INDEX: 36.1 KG/M2 | HEIGHT: 67 IN | SYSTOLIC BLOOD PRESSURE: 120 MMHG | HEART RATE: 88 BPM | DIASTOLIC BLOOD PRESSURE: 87 MMHG | WEIGHT: 230 LBS

## 2023-03-01 DIAGNOSIS — R87.810 ASCUS WITH POSITIVE HIGH RISK HPV CERVICAL: Primary | ICD-10-CM

## 2023-03-01 DIAGNOSIS — R87.610 ASCUS WITH POSITIVE HIGH RISK HPV CERVICAL: Primary | ICD-10-CM

## 2023-03-01 LAB
CONTROL: PRESENT
PREGNANCY TEST URINE, POC: NEGATIVE

## 2023-03-01 PROCEDURE — 81025 URINE PREGNANCY TEST: CPT

## 2023-03-01 PROCEDURE — 57455 BIOPSY OF CERVIX W/SCOPE: CPT

## 2023-03-01 PROCEDURE — 99211 OFF/OP EST MAY X REQ PHY/QHP: CPT

## 2023-03-01 NOTE — PROGRESS NOTES
[facility]   Procedure Note    Frederick Donaldson  3/1/2023                       Primary Care Physician: Margie Marcial MD      Subjective:   Frederick Donaldson 34 y.o. female [id number] is here for previously scheduled for colposcopy due to history of ASCUS with positive HPV 18. No LMP recorded. . She has no complaints today aside from some mild vaginal spotting     Vitals:   Blood pressure 120/87, pulse 88, height 5' 6.5\" (1.689 m), weight 230 lb (104.3 kg), not currently breastfeeding. Procedure: Colposcopy with endocervical curettage    Indications: abnormal cytology history ASCUS. HPV 18 positive. Procedure Details:   Chaperone for Intimate Exam: Chaperone was present for entire exam, Chaperone Name: SANDOR Silveira    The patient was counseled on the procedure. Risks, benefits and alternatives were reviewed. The patient is aware that this is diagnostic and not curative and a second procedure may be needed. A consent was reviewed and obtained. The patient was positioned comfortably on the exam table. Colposcopy, Endocervical Curettage  A sterile speculum was placed into the vagina and the cervix was identified. The colposcope was positioned and the cervix was examined revealing no visible lesions. Scant dark blood in vaginal vault and on cervix, easily cleansed with lollipops x3. Acetoacetic acid was applied to the cervix, revealing no visible lesions. The SCJ was visualized. Lugol's Iodine was applied to the cervix revealing  no visible lesions. Green light was used to evaluate the vessels, revealing  no visible lesions. The exam was considered to be satisfactory with the transformation zone visualized. No Biopsy(s) were taken as no visible lesions seen on Colposcope. Endocervical curettage was then performed and tissue collected was sent to pathology. Impression: Normal appearance of cervix on Colposcope with no visible lesions    The patient tolerated the procedure well.  Post procedure restrictions were reviewed and given to the patient. All counts and instruments were correct at the end of the procedure. Physical Exam  Genitourinary:           Red indicates SCJ      Lab:  Pregnancy Test:  Lab Results   Component Value Date    PREGTESTUR negative 2023    HCG NEGATIVE 2013     Assessment & Plan:  Suze Melendez 34 y.o. female [id number]   - VSS, afebrile   - POCT pregnancy test negative   - No visible lesions on Colposcopy   - ECC collected and sent to pathology   - Patient to return in 1 week for telephone visit to discuss results   -  Patient instructed to report any increase in vaginal bleeding, discharge, or any temperature more than  100.4   F. Strict pelvic rest precautions were reviewed with lifting restrictions. Patient Active Problem List    Diagnosis Date Noted     22 F Apg 8 Wt 9#7 2022     Priority: Medium    Post Placental IUD 22     Lot #: [id number]  Exp: 2025      Alpha thalassemia silent carrier 2022     Low fetal risk based on sgNIPT      ASCUS with positive, + HPV 18 2022     Positive HPV 18. Colposcopy done 22 - AW changes and decreased lugol's uptake at 11 o'clock - no biopsies taken  Will need colposcopy at 6 wks PP      Hepatitis C antibody test positive 2022     Hep C quant RNA was 269,000 on 8/10/22  ALT/AST 38/37 on 10/6/22  Repeat Liver enzymes @ 36 weeks      Opioid use disorder 2022     IV heroin, clean 3/28/21. Currently in treatment at [facility]. However, they have initiated her discharge, as she has completed the program. No MAT.    22: Declines participation in [facility] at this time. Unvaccinated for covid-19 2022      Pt counseled on the the risks of not getting vaccinated in pregnancy against COVID-19. Pregnant women with symptomatic covid have a 70% increased risk of death.   Covid-19 during pregnancy  increases the risk for adverse outcomes, including  birth and admission of the baby to an intensive care unit. Electronic cigarette use 2022     Vapes nicotine daily. Maternal and fetal risks reviewed. Patient verbalizes understanding. Overweight (BMI 25.0-29.9) 2022     Pregravid BMI 29.75       The patient was counseled on follow up and home care with restrictions noted. Return in about 1 week (around 3/8/2023) for Telephone encounter for results.     Allegra Raman MD  Ob/Gyn Resident  [facility], [facility]  3/1/2023, 3:59 PM

## 2023-03-03 LAB — SURGICAL PATHOLOGY REPORT: NORMAL

## 2023-03-03 NOTE — PROGRESS NOTES
Attending Physician Statement  I have discussed the care of Lida Menard, including pertinent history and exam findings,  with the resident. I have seen and examined the patient and the key elements of all parts of the encounter have been performed by me. I agree with the assessment, plan and orders as documented by the resident.   ([address] [facility])    Kacie Dawson, DO

## 2023-03-08 ENCOUNTER — SCHEDULED TELEPHONE ENCOUNTER (OUTPATIENT)
Dept: OBGYN | Age: 33
End: 2023-03-08
Payer: MEDICAID

## 2023-03-08 DIAGNOSIS — R87.610 ASCUS WITH POSITIVE HIGH RISK HPV CERVICAL: ICD-10-CM

## 2023-03-08 DIAGNOSIS — Z09 FOLLOW-UP EXAMINATION: Primary | ICD-10-CM

## 2023-03-08 DIAGNOSIS — R87.810 ASCUS WITH POSITIVE HIGH RISK HPV CERVICAL: ICD-10-CM

## 2023-03-08 PROCEDURE — 99423 OL DIG E/M SVC 21+ MIN: CPT

## 2023-03-08 RX ORDER — PNV NO.95/FERROUS FUM/FOLIC AC 28MG-0.8MG
1 TABLET ORAL DAILY
Qty: 30 TABLET | Refills: 5 | Status: SHIPPED | OUTPATIENT
Start: 2023-03-08 | End: 2023-04-07

## 2023-03-08 NOTE — PROGRESS NOTES
Obstetric/Gynecology Resident Telephone Visit Note    Patient reports no complaints today. Reese Wadsworth is a 34 y.o. female evaluated via telephone on 3/8/2023. Consent:  She and/or health care decision maker is aware that that she may receive a bill for this telephone service, depending on her insurance coverage, and has provided verbal consent to proceed: Yes    Documentation:  I communicated with the patient and/or health care decision maker about ECC results. Details of this discussion including any medical advice provided: Informed patient that ECC was benign. Per ASCCP guidelines, she will need a repeat PAP in one year. Patient also encouraged to return for her subsequent HPV vaccines in April and August.     I affirm this is a Patient Initiated Episode with an Established Patient who has not had a related appointment within my department in the past 7 days or scheduled within the next 24 hours.     Patient identification was verified at the start of the visit: Yes    Total Time: minutes: 5-10 minutes    Note: not billable if this call serves to triage the patient into an appointment for the relevant concern      Doris Ardon MD  OBGYN Resident, PGY2  [id number]  3/8/2023, 3:06 PM

## 2023-03-13 NOTE — PROGRESS NOTES
Attending Physician Statement  I have discussed the care of Stone Macias, including pertinent history and exam findings,  with the resident. I have reviewed the key elements of all parts of the encounter with the resident. I agree with the assessment, plan and orders as documented by the resident.   ([facility])    Maria Del Rosario Gutierrez DO

## 2023-03-29 ENCOUNTER — OFFICE VISIT (OUTPATIENT)
Dept: GASTROENTEROLOGY | Age: 33
End: 2023-03-29
Payer: MEDICAID

## 2023-03-29 VITALS
SYSTOLIC BLOOD PRESSURE: 125 MMHG | DIASTOLIC BLOOD PRESSURE: 89 MMHG | TEMPERATURE: 98.1 F | BODY MASS INDEX: 36.88 KG/M2 | WEIGHT: 232 LBS

## 2023-03-29 DIAGNOSIS — R76.8 HEPATITIS C ANTIBODY TEST POSITIVE: ICD-10-CM

## 2023-03-29 DIAGNOSIS — B18.2 HEP C W/O COMA, CHRONIC (HCC): Primary | ICD-10-CM

## 2023-03-29 DIAGNOSIS — Z78.9 ELECTRONIC CIGARETTE USE: ICD-10-CM

## 2023-03-29 DIAGNOSIS — Z11.59 SCREENING FOR VIRAL DISEASE: ICD-10-CM

## 2023-03-29 DIAGNOSIS — F11.90 OPIOID USE DISORDER: ICD-10-CM

## 2023-03-29 PROCEDURE — G8417 CALC BMI ABV UP PARAM F/U: HCPCS | Performed by: INTERNAL MEDICINE

## 2023-03-29 PROCEDURE — 99204 OFFICE O/P NEW MOD 45 MIN: CPT | Performed by: INTERNAL MEDICINE

## 2023-03-29 PROCEDURE — G8484 FLU IMMUNIZE NO ADMIN: HCPCS | Performed by: INTERNAL MEDICINE

## 2023-03-29 PROCEDURE — 1036F TOBACCO NON-USER: CPT | Performed by: INTERNAL MEDICINE

## 2023-03-29 PROCEDURE — G8427 DOCREV CUR MEDS BY ELIG CLIN: HCPCS | Performed by: INTERNAL MEDICINE

## 2023-03-29 ASSESSMENT — ENCOUNTER SYMPTOMS
SHORTNESS OF BREATH: 0
COUGH: 0
ABDOMINAL DISTENTION: 0
NAUSEA: 0
ABDOMINAL PAIN: 0
VOMITING: 0
CHOKING: 0
DIARRHEA: 0
WHEEZING: 0
VOICE CHANGE: 0
TROUBLE SWALLOWING: 0
SORE THROAT: 0
CONSTIPATION: 0
ANAL BLEEDING: 0
RECTAL PAIN: 0
BLOOD IN STOOL: 0

## 2023-03-29 NOTE — PROGRESS NOTES
Disp: 30 tablet, Rfl: 5    ferrous sulfate (FE TABS) 325 (65 Fe) MG EC tablet, Take 1 tablet by mouth 2 times daily, Disp: 90 tablet, Rfl: 3    ibuprofen (ADVIL;MOTRIN) 600 MG tablet, Take 1 tablet by mouth every 6 hours as needed for Pain (Patient not taking: No sig reported), Disp: 40 tablet, Rfl: 1    acetaminophen (TYLENOL) 500 MG tablet, Take 2 tablets by mouth every 6 hours as needed for Pain (Patient not taking: No sig reported), Disp: 30 tablet, Rfl: 1    aspirin EC 81 MG EC tablet, Take 1 tablet by mouth in the morning.  (Patient not taking: No sig reported), Disp: 30 tablet, Rfl: 5    ALLERGIES:   No Known Allergies    FAMILY HISTORY:       Problem Relation Age of Onset    No Known Problems Paternal Grandfather     No Known Problems Paternal Grandmother     No Known Problems Maternal Grandmother     Diabetes Maternal Grandfather     Kidney Disease Father     Hypertension Mother     No Known Problems Half-Brother     No Known Problems Half-Sister     No Known Problems Sister          SOCIAL HISTORY:   Social History     Socioeconomic History    Marital status: Single     Spouse name: Not on file    Number of children: Not on file    Years of education: Not on file    Highest education level: Not on file   Occupational History    Not on file   Tobacco Use    Smoking status: Former     Packs/day: 1.00     Years: 10.00     Pack years: 10.00     Types: Cigarettes     Quit date:      Years since quittin.2     Passive exposure: Never    Smokeless tobacco: Never    Tobacco comments:     Vaping \"Use just like after eating, maybe 12 puffs/day\"  2022   Vaping Use    Vaping Use: Every day    Substances: Nicotine    Passive vaping exposure: Yes   Substance and Sexual Activity    Alcohol use: Not Currently     Comment: Last drink sometime in , per patient    Drug use: Not Currently     Types: IV, Heroin     Comment: 3/28/2021 clean date; in treatment with [facility]    Sexual activity: Yes

## 2023-04-19 ENCOUNTER — NURSE ONLY (OUTPATIENT)
Dept: OBGYN | Age: 33
End: 2023-04-19
Payer: MEDICAID

## 2023-04-19 DIAGNOSIS — Z23 NEED FOR HPV VACCINATION: Primary | ICD-10-CM

## 2023-04-19 PROCEDURE — 90651 9VHPV VACCINE 2/3 DOSE IM: CPT | Performed by: OBSTETRICS & GYNECOLOGY

## 2023-04-19 PROCEDURE — 99999 PR OFFICE/OUTPT VISIT,PROCEDURE ONLY: CPT | Performed by: OBSTETRICS & GYNECOLOGY

## 2023-04-19 NOTE — PROGRESS NOTES
Patient was given 2nd gardasil injection in the Left Deltoid.  Per doctor Debbie Cosme  NDC# 2529-9712-93  LOT# [id number]  Exp date- 12-16-24  Patient tolerated well without difficulty

## 2023-04-24 ENCOUNTER — TELEPHONE (OUTPATIENT)
Dept: SOCIAL WORK | Age: 33
End: 2023-04-24

## 2023-04-24 NOTE — TELEPHONE ENCOUNTER
Mother and Child Dependency Program    Marjorie successfully discharged the Pt from [facility] and Child Dependency on 4/24/2023. The Pt remained active and compliant in the program since her enrollment on 7/19/2022. Pt was connected to the following resources during pregnancy and postpartum: car seat, crib, furniture, [address] and OB care.

## 2023-04-25 ENCOUNTER — HOSPITAL ENCOUNTER (OUTPATIENT)
Dept: ULTRASOUND IMAGING | Age: 33
Discharge: HOME | End: 2023-04-27
Payer: MEDICAID

## 2023-04-25 DIAGNOSIS — B18.2 HEP C W/O COMA, CHRONIC (HCC): ICD-10-CM

## 2023-04-25 PROCEDURE — 76705 ECHO EXAM OF ABDOMEN: CPT

## 2023-06-22 ENCOUNTER — HOSPITAL ENCOUNTER (OUTPATIENT)
Age: 33
Setting detail: SPECIMEN
Discharge: HOME | End: 2023-06-22

## 2023-06-22 DIAGNOSIS — B18.2 HEP C W/O COMA, CHRONIC (HCC): ICD-10-CM

## 2023-06-22 DIAGNOSIS — Z11.59 SCREENING FOR VIRAL DISEASE: ICD-10-CM

## 2023-06-22 LAB
ALBUMIN SERPL-MCNC: 4.6 G/DL (ref 3.5–5.2)
ALBUMIN/GLOB SERPL: 1.4 {RATIO} (ref 1–2.5)
ALP SERPL-CCNC: 59 U/L (ref 35–104)
ALT SERPL-CCNC: 88 U/L (ref 5–33)
ANION GAP SERPL CALCULATED.3IONS-SCNC: 13 MMOL/L (ref 9–17)
AST SERPL-CCNC: 51 U/L
BASOPHILS # BLD: <0.03 K/UL (ref 0–0.2)
BASOPHILS NFR BLD: 0 % (ref 0–2)
BILIRUB DIRECT SERPL-MCNC: <0.1 MG/DL
BILIRUB INDIRECT SERPL-MCNC: ABNORMAL MG/DL (ref 0–1)
BILIRUB SERPL-MCNC: 0.2 MG/DL (ref 0.3–1.2)
BUN SERPL-MCNC: 7 MG/DL (ref 6–20)
CALCIUM SERPL-MCNC: 9.6 MG/DL (ref 8.6–10.4)
CHLORIDE SERPL-SCNC: 104 MMOL/L (ref 98–107)
CO2 SERPL-SCNC: 22 MMOL/L (ref 20–31)
CREAT SERPL-MCNC: 0.64 MG/DL (ref 0.5–0.9)
EOSINOPHIL # BLD: 0.1 K/UL (ref 0–0.44)
EOSINOPHILS RELATIVE PERCENT: 2 % (ref 1–4)
ERYTHROCYTE [DISTWIDTH] IN BLOOD BY AUTOMATED COUNT: 13.1 % (ref 11.8–14.4)
GFR SERPL CREATININE-BSD FRML MDRD: >60 ML/MIN/1.73M2
GLUCOSE SERPL-MCNC: 82 MG/DL (ref 70–99)
HAV AB SERPL IA-ACNC: NONREACTIVE
HBV CORE AB SER QL: NONREACTIVE
HBV SURFACE AB SERPL IA-ACNC: 533.6 MIU/ML
HBV SURFACE AG SERPL QL IA: NONREACTIVE
HCT VFR BLD AUTO: 41.1 % (ref 36.3–47.1)
HCV AB SERPL QL IA: REACTIVE
HGB BLD-MCNC: 13.4 G/DL (ref 11.9–15.1)
HIV 1+2 AB+HIV1 P24 AG SERPL QL IA: NONREACTIVE
IMM GRANULOCYTES # BLD AUTO: <0.03 K/UL (ref 0–0.3)
IMM GRANULOCYTES NFR BLD: 0 %
LYMPHOCYTES # BLD: 43 % (ref 24–43)
LYMPHOCYTES NFR BLD: 2.69 K/UL (ref 1.1–3.7)
MCH RBC QN AUTO: 27.5 PG (ref 25.2–33.5)
MCHC RBC AUTO-ENTMCNC: 32.6 G/DL (ref 28.4–34.8)
MCV RBC AUTO: 84.2 FL (ref 82.6–102.9)
MONOCYTES NFR BLD: 0.38 K/UL (ref 0.1–1.2)
MONOCYTES NFR BLD: 6 % (ref 3–12)
NEUTROPHILS NFR BLD: 49 % (ref 36–65)
NEUTS SEG NFR BLD: 3.06 K/UL (ref 1.5–8.1)
NRBC AUTOMATED: 0 PER 100 WBC
PLATELET # BLD AUTO: 249 K/UL (ref 138–453)
PMV BLD AUTO: 11.3 FL (ref 8.1–13.5)
POTASSIUM SERPL-SCNC: 4 MMOL/L (ref 3.7–5.3)
PROT SERPL-MCNC: 7.9 G/DL (ref 6.4–8.3)
RBC # BLD AUTO: 4.88 M/UL (ref 3.95–5.11)
SODIUM SERPL-SCNC: 139 MMOL/L (ref 135–144)
WBC OTHER # BLD: 6.3 K/UL (ref 3.5–11.3)

## 2023-06-23 LAB
HCV RNA # SERPL NAA+PROBE: DETECTED {COPIES}/ML
HCV RNA SERPL NAA+PROBE-ACNC: ABNORMAL IU/ML
HCV RNA SERPL NAA+PROBE-LOG IU: 5.53 LOG IU/ML
MITOCHONDRIA M2 IGG SER-ACNC: 1.2 U/ML (ref 0–4)
SPECIMEN SOURCE: ABNORMAL

## 2023-06-27 LAB
ALANINE AMINOTRANSFERASE, FIBROMETER: 93 U/L (ref 5–40)
ALPHA-2-MACROGLOBULIN, FIBROMETER: 181 MG/DL (ref 131–293)
ASPARTATE AMINOTRANSFERASE, FIBROMETER: 57 U/L (ref 9–40)
CIRRHOMETER PATIENT SCORE: 0
EER FIBROMETER REPORT: ABNORMAL
FIBROMETER INTERPRETATION: ABNORMAL
FIBROMETER PATIENT SCORE: 0.11
FIBROMETER PLATELET COUNT: ABNORMAL
FIBROMETER PROTHROMBIN INDEX: 114 % (ref 90–120)
FIBROSIS METAVIR CLASSIFICATION: ABNORMAL
GAMMA GLUTAMYL TRANSFERASE, FIBROMETER: 44 U/L (ref 7–33)
INFLAMETER METAVIR CLASSIFICATION: ABNORMAL
INFLAMETER PATIENT SCORE: 0.29
UREA NITROGEN, FIBROMETER: 7 MG/DL (ref 7–20)

## 2023-06-29 LAB — HCV GENTYP SERPL NAA+PROBE: NORMAL

## 2023-07-12 ENCOUNTER — OFFICE VISIT (OUTPATIENT)
Dept: GASTROENTEROLOGY | Age: 33
End: 2023-07-12
Payer: MEDICAID

## 2023-07-12 VITALS
TEMPERATURE: 97.9 F | DIASTOLIC BLOOD PRESSURE: 98 MMHG | BODY MASS INDEX: 35.45 KG/M2 | SYSTOLIC BLOOD PRESSURE: 149 MMHG | WEIGHT: 223 LBS

## 2023-07-12 DIAGNOSIS — Z78.9 ELECTRONIC CIGARETTE USE: ICD-10-CM

## 2023-07-12 DIAGNOSIS — R76.8 HEPATITIS C ANTIBODY TEST POSITIVE: ICD-10-CM

## 2023-07-12 DIAGNOSIS — B18.2 HEP C W/O COMA, CHRONIC (HCC): Primary | ICD-10-CM

## 2023-07-12 DIAGNOSIS — F11.90 OPIOID USE DISORDER: ICD-10-CM

## 2023-07-12 PROCEDURE — 99214 OFFICE O/P EST MOD 30 MIN: CPT | Performed by: INTERNAL MEDICINE

## 2023-07-12 PROCEDURE — 1036F TOBACCO NON-USER: CPT | Performed by: INTERNAL MEDICINE

## 2023-07-12 PROCEDURE — G8417 CALC BMI ABV UP PARAM F/U: HCPCS | Performed by: INTERNAL MEDICINE

## 2023-07-12 PROCEDURE — G8427 DOCREV CUR MEDS BY ELIG CLIN: HCPCS | Performed by: INTERNAL MEDICINE

## 2023-07-12 RX ORDER — VELPATASVIR AND SOFOSBUVIR 100; 400 MG/1; MG/1
1 TABLET, FILM COATED ORAL DAILY
Qty: 28 TABLET | Refills: 2 | Status: SHIPPED | OUTPATIENT
Start: 2023-07-12

## 2023-07-12 ASSESSMENT — ENCOUNTER SYMPTOMS
SHORTNESS OF BREATH: 0
RECTAL PAIN: 0
ABDOMINAL PAIN: 0
ANAL BLEEDING: 0
VOICE CHANGE: 0
TROUBLE SWALLOWING: 0
ABDOMINAL DISTENTION: 0
CHOKING: 0
SORE THROAT: 0
CONSTIPATION: 0
VOMITING: 0
DIARRHEA: 0
COUGH: 0
BLOOD IN STOOL: 0
WHEEZING: 0
NAUSEA: 0

## 2023-07-12 NOTE — PROGRESS NOTES
GI CLINIC FOLLOW UP    NTERVAL HISTORY:   No referring provider defined for this encounter. Chief Complaint   Patient presents with    Results     Pt is here today for a f/u on labs/US results, pt last seen on 3/29/23 for chronic Hep C w/o coma. Pt is being seen today to start treatment. 1. Hep C w/o coma, chronic ([address] )    2. Hepatitis C antibody test positive    3. Electronic cigarette use    4. Opioid use disorder        The patient seen my office as a follow-up  She had hepatitis C work-up which was reviewed with her  We will be started on the treatment    Irritable bowel syndrome symptoms some fatigue issues  She works at a [employer]    Denies any bleeding melanotic stool denies any significant GERD dysphagia nausea vomiting hematemesis    Previous records were reviewed with her    HISTORY OF PRESENT ILLNESS: Jessica Camacho is a 34 y.o. female with a past history remarkable for , referred for evaluation of   Chief Complaint   Patient presents with    Results     Pt is here today for a f/u on labs/US results, pt last seen on 3/29/23 for chronic Hep C w/o coma. Pt is being seen today to start treatment. .    Past Medical,Family, and Social History reviewed and does contribute to the patient presenting condition. Patient's PMH/PSH,SH,PSYCH Hx, MEDs, ALLERGIES, and ROS were all reviewed and updated in the appropriate sections. PAST MEDICAL HISTORY:  Past Medical History:   Diagnosis Date    ASCUS with positive high risk HPV cervical 8/23/2022    Gestational hypertension 12/24/2022    Trauma     Pt says \"the list goes on. I don't even know where to start. \"       No past surgical history on file.     CURRENT MEDICATIONS:    Current Outpatient Medications:     Sofosbuvir-Velpatasvir 400-100 MG TABS, Take 1 tablet by mouth daily, Disp: 28 tablet, Rfl: 2    ferrous sulfate (FE TABS) 325 (65 Fe) MG EC tablet, Take 1 tablet by mouth 2 times daily, Disp: 90 tablet, Rfl: 3    Prenatal Vit-Fe

## 2023-07-31 LAB
ALANINE AMINOTRANSFERASE, FIBROMETER: 93 U/L (ref 5–40)
ALPHA-2-MACROGLOBULIN, FIBROMETER: 181 MG/DL (ref 131–293)
ASPARTATE AMINOTRANSFERASE, FIBROMETER: 57 U/L (ref 9–40)
CIRRHOMETER PATIENT SCORE: 0
EER FIBROMETER REPORT: ABNORMAL
FIBROMETER INTERPRETATION: ABNORMAL
FIBROMETER PATIENT SCORE: 0.11
FIBROMETER PLATELET COUNT: 249
FIBROMETER PROTHROMBIN INDEX: 114 % (ref 90–120)
FIBROSIS METAVIR CLASSIFICATION: ABNORMAL
GAMMA GLUTAMYL TRANSFERASE, FIBROMETER: 44 U/L (ref 7–33)
INFLAMETER METAVIR CLASSIFICATION: ABNORMAL
INFLAMETER PATIENT SCORE: 0.29
UREA NITROGEN, FIBROMETER: 7 MG/DL (ref 7–20)

## 2023-08-16 ENCOUNTER — NURSE ONLY (OUTPATIENT)
Dept: OBGYN | Age: 33
End: 2023-08-16
Payer: MEDICAID

## 2023-08-16 DIAGNOSIS — Z23 NEED FOR HPV VACCINATION: Primary | ICD-10-CM

## 2023-08-16 PROCEDURE — 90651 9VHPV VACCINE 2/3 DOSE IM: CPT

## 2023-08-16 PROCEDURE — 90471 IMMUNIZATION ADMIN: CPT

## 2023-08-16 NOTE — PROGRESS NOTES
Patient was given Gardasil 9 dose 3  in the Left Deltoid.  Per doctor [facility]# [id number]   LOT# [id number]  Exp date- 01/29/2025  Patient tolerated well without difficulty

## 2023-10-11 ENCOUNTER — OFFICE VISIT (OUTPATIENT)
Dept: GASTROENTEROLOGY | Age: 33
End: 2023-10-11
Payer: MEDICAID

## 2023-10-11 VITALS
DIASTOLIC BLOOD PRESSURE: 82 MMHG | WEIGHT: 221 LBS | TEMPERATURE: 98 F | SYSTOLIC BLOOD PRESSURE: 117 MMHG | BODY MASS INDEX: 35.14 KG/M2

## 2023-10-11 DIAGNOSIS — B18.2 CHRONIC HEPATITIS C WITHOUT HEPATIC COMA (HCC): Primary | ICD-10-CM

## 2023-10-11 DIAGNOSIS — D56.3 ALPHA THALASSEMIA SILENT CARRIER: ICD-10-CM

## 2023-10-11 DIAGNOSIS — F11.90 OPIOID USE DISORDER: ICD-10-CM

## 2023-10-11 DIAGNOSIS — E66.3 OVERWEIGHT (BMI 25.0-29.9): ICD-10-CM

## 2023-10-11 PROCEDURE — G8484 FLU IMMUNIZE NO ADMIN: HCPCS | Performed by: INTERNAL MEDICINE

## 2023-10-11 PROCEDURE — G8417 CALC BMI ABV UP PARAM F/U: HCPCS | Performed by: INTERNAL MEDICINE

## 2023-10-11 PROCEDURE — 1036F TOBACCO NON-USER: CPT | Performed by: INTERNAL MEDICINE

## 2023-10-11 PROCEDURE — G8427 DOCREV CUR MEDS BY ELIG CLIN: HCPCS | Performed by: INTERNAL MEDICINE

## 2023-10-11 PROCEDURE — 99214 OFFICE O/P EST MOD 30 MIN: CPT | Performed by: INTERNAL MEDICINE

## 2023-10-11 ASSESSMENT — ENCOUNTER SYMPTOMS
CONSTIPATION: 0
NAUSEA: 0
BLOOD IN STOOL: 0
ABDOMINAL PAIN: 0
DIARRHEA: 0
WHEEZING: 0
COUGH: 0
ABDOMINAL DISTENTION: 0
RECTAL PAIN: 0
TROUBLE SWALLOWING: 0
ANAL BLEEDING: 0
SHORTNESS OF BREATH: 0
VOMITING: 0
VOICE CHANGE: 0
CHOKING: 0
SORE THROAT: 0

## 2023-10-11 NOTE — PROGRESS NOTES
GI CLINIC FOLLOW UP    NTERVAL HISTORY:   No referring provider defined for this encounter. Chief Complaint   Patient presents with    Hepatitis C     Pt is here today for a 3 month f/u on Hep C, pt last seen on 7/12/23. 1. Chronic hepatitis C without hepatic coma (HCC)    2. Opioid use disorder    3. Overweight (BMI 25.0-29.9)    4. Alpha thalassemia silent carrier      Patient seen after the follow-up  She is currently treated for hepatitis C  She is couple weeks after her treatment  Taking pills regularly as per history overall feeling well  Denies any current smoking alcohol abuse illicit drug usage    No bleeding no melena    Mild irritable bowel syndrome      Some anxiety issues    Previous records reviewed with her    HISTORY OF PRESENT ILLNESS: Keenan Baeza is a 28 y.o. female with a past history remarkable for , referred for evaluation of   Chief Complaint   Patient presents with    Hepatitis C     Pt is here today for a 3 month f/u on Hep C, pt last seen on 7/12/23. Litzy Cornelius Past Medical,Family, and Social History reviewed and does contribute to the patient presenting condition. Patient's PMH/PSH,SH,PSYCH Hx, MEDs, ALLERGIES, and ROS were all reviewed and updated in the appropriate sections. PAST MEDICAL HISTORY:  Past Medical History:   Diagnosis Date    ASCUS with positive high risk HPV cervical 8/23/2022    Gestational hypertension 12/24/2022    Trauma     Pt says \"the list goes on. I don't even know where to start. \"       No past surgical history on file.     CURRENT MEDICATIONS:    Current Outpatient Medications:     Sofosbuvir-Velpatasvir 400-100 MG TABS, Take 1 tablet by mouth daily, Disp: 28 tablet, Rfl: 2    ferrous sulfate (FE TABS) 325 (65 Fe) MG EC tablet, Take 1 tablet by mouth 2 times daily, Disp: 90 tablet, Rfl: 3    Prenatal Vit-Fe Fumarate-FA (PRENATAL VITAMIN) 27-0.8 MG TABS, Take 1 tablet by mouth daily May substitute with any prenatal vitamin covered by the

## 2024-07-02 ENCOUNTER — TELEPHONE (OUTPATIENT)
Dept: OBGYN | Age: 34
End: 2024-07-02

## 2024-07-02 NOTE — TELEPHONE ENCOUNTER
Patient walked into clinic has been trying to  schedule a annual exam states she is high risk HPV.

## 2024-07-02 NOTE — TELEPHONE ENCOUNTER
Patient was called and informed we do not have any available openings at this time and it does look like she is on the wait list to be called

## 2024-09-05 NOTE — PROGRESS NOTES
[facility]   Procedure Note    Maribell Montenegro  9/16/2022                       Primary Care Physician: Doron Arnold MD      Subjective:   Maribell Montenegro 34 y.o. female [id number] is here for previously scheduled colposcopy due to pap smear that showed ASCUS +HP HPV 18 and other . She is 25 weeks 5 days pregnant today. Doing well w/ no complaints, just nervous. Vitals:   Blood pressure 120/78, pulse (!) 112, weight 233 lb (105.7 kg), last menstrual period 03/30/2022. Procedure: Colposcopy     Indications: ASCUS +HP HPV 18 and other    Procedure Details:   Chaperone for Intimate Exam: Chaperone was present for entire exam, Chaperone Name: Roberto Lynch, [address] [place] [address] [address]    The patient was counseled on the procedure. Risks, benefits and alternatives were reviewed. The patient is aware that this is diagnostic and not curative and a second procedure may be needed. A consent was reviewed and obtained. The patient was positioned comfortably on the exam table. Colposcopy  A sterile speculum was placed into the vagina and the cervix was identified. Side wall retractor was needed for visualization. Transformation zone not immediately apparent, patient has large, multiparous cervix. Transformation zone was able to be visualized after using small cotton swab and cervical speculum. The colposcope was positioned and the cervix was examined revealing no visible lesions. Acetoacetic acid was applied to the cervix, revealing very small area of acetowhite change at 11 o clock. Lugol's Iodine was applied to the cervix revealing decreased uptake at 11 o clock. The exam was considered  to be satisfactory with the transformation zone visualized. No biopsies were taken    Impression: Satisfactory colposcopy w/ acetowhite change and decreased uptake of Lugol's iodine at 11 o clock     The patient tolerated the procedure well. Post procedure restrictions were reviewed and given to the patient.   All counts and instruments were correct at the end of the procedure. Physical Exam  Genitourinary:              bpm at conclusion of procedure      Lab:  Pregnancy Test:  Lab Results   Component Value Date    PREGTESTUR NEGATIVE 2015    HCG NEGATIVE 2013       Assessment & Plan:  Arvell Lighter 34 y.o. female [id number]   - VSS, slightly tachycardic likely 2/2 nerves   - Satisfactory colposcopy w/ acetowhite change and decreased uptake of Lugol's iodine at 11 o clock    - No bx taken   -  bpm   - F/u @ [facility]   - Will need colpo 6 weeks PP    Patient Active Problem List    Diagnosis Date Noted    High-risk pregnancy 2022     Priority: Medium     Risks: Opioid use disorder; daily e cigarette use; overweight; unvaccinated for COVID-19    Fetal testing indicated: No  Fetal testing indication: N/A  Fetal testing initiation: N/A  Fetal testing frequency: N/A      Alpha thalassemia silent carrier 2022     Low fetal risk based on sgNIPT      ASCUS with positive high risk HPV cervical 2022     Positive HPV 18. Plan: colposcopy       Hepatitis C antibody test positive 2022     Check Hep C quant RNA      Opioid use disorder 2022     IV heroin, clean 3/28/21. Currently in treatment at [facility]. However, they have initiated her discharge, as she has completed the program. No MAT.    22: Declines participation in [facility] at this time. Unvaccinated for covid-19 2022      Pt counseled on the the risks of not getting vaccinated in pregnancy against COVID-19. Pregnant women with symptomatic covid have a 70% increased risk of death. Covid-19 during pregnancy  increases the risk for adverse outcomes, including  birth and admission of the baby to an intensive care unit. Electronic cigarette use 2022     Vapes nicotine daily. Maternal and fetal risks reviewed. Patient verbalizes understanding.       Overweight (BMI 25.0-29.9) 2022     Pregravid BMI 29.75         The patient was counseled on follow up and home care with restrictions noted. Return for [facility].     Emma Finney DO  Ob/Gyn Resident  [facility] [address]  9/16/2022, 10:48 AM 48

## 2025-02-10 ENCOUNTER — TELEPHONE (OUTPATIENT)
Dept: OBGYN | Age: 35
End: 2025-02-10

## 2025-02-10 NOTE — TELEPHONE ENCOUNTER
Patient presented to the practice  requesting an update on our clinic wait list. Informed patient that their appointment request would be routed to the clinical support pool for further review. Patient states they can also obtain messages via [employer].    Electronically signed by Arthur Gibson MA on 2/10/2025 at 2:24 PM

## 2025-02-13 ENCOUNTER — HOSPITAL ENCOUNTER (OUTPATIENT)
Age: 35
Setting detail: SPECIMEN
Discharge: HOME | End: 2025-02-13
Payer: MEDICAID

## 2025-02-13 ENCOUNTER — OFFICE VISIT (OUTPATIENT)
Dept: OBGYN | Age: 35
End: 2025-02-13
Payer: MEDICAID

## 2025-02-13 VITALS
DIASTOLIC BLOOD PRESSURE: 78 MMHG | BODY MASS INDEX: 32.58 KG/M2 | HEART RATE: 85 BPM | WEIGHT: 215 LBS | SYSTOLIC BLOOD PRESSURE: 124 MMHG | HEIGHT: 68 IN

## 2025-02-13 DIAGNOSIS — Z11.3 SCREENING EXAMINATION FOR STI: ICD-10-CM

## 2025-02-13 DIAGNOSIS — N94.6 DYSMENORRHEA: ICD-10-CM

## 2025-02-13 DIAGNOSIS — Z01.419 WELL WOMAN EXAM WITH ROUTINE GYNECOLOGICAL EXAM: Primary | ICD-10-CM

## 2025-02-13 PROBLEM — Z28.310 UNVACCINATED FOR COVID-19: Status: RESOLVED | Noted: 2022-07-28 | Resolved: 2025-02-13

## 2025-02-13 PROBLEM — E66.3 OVERWEIGHT (BMI 25.0-29.9): Status: RESOLVED | Noted: 2022-07-28 | Resolved: 2025-02-13

## 2025-02-13 LAB
HBV SURFACE AG SERPL QL IA: NONREACTIVE
HIV 1+2 AB+HIV1 P24 AG SERPL QL IA: NONREACTIVE
T PALLIDUM AB SER QL IA: NONREACTIVE

## 2025-02-13 PROCEDURE — 99211 OFF/OP EST MAY X REQ PHY/QHP: CPT | Performed by: STUDENT IN AN ORGANIZED HEALTH CARE EDUCATION/TRAINING PROGRAM

## 2025-02-13 PROCEDURE — 87389 HIV-1 AG W/HIV-1&-2 AB AG IA: CPT

## 2025-02-13 PROCEDURE — 36415 COLL VENOUS BLD VENIPUNCTURE: CPT

## 2025-02-13 PROCEDURE — 86780 TREPONEMA PALLIDUM: CPT

## 2025-02-13 PROCEDURE — 99395 PREV VISIT EST AGE 18-39: CPT | Performed by: STUDENT IN AN ORGANIZED HEALTH CARE EDUCATION/TRAINING PROGRAM

## 2025-02-13 PROCEDURE — 87340 HEPATITIS B SURFACE AG IA: CPT

## 2025-02-13 ASSESSMENT — PATIENT HEALTH QUESTIONNAIRE - PHQ9
SUM OF ALL RESPONSES TO PHQ QUESTIONS 1-9: 0
1. LITTLE INTEREST OR PLEASURE IN DOING THINGS: NOT AT ALL
2. FEELING DOWN, DEPRESSED OR HOPELESS: NOT AT ALL
SUM OF ALL RESPONSES TO PHQ9 QUESTIONS 1 & 2: 0
SUM OF ALL RESPONSES TO PHQ QUESTIONS 1-9: 0

## 2025-02-13 NOTE — PROGRESS NOTES
[facility] Annual Visit    Maria Antonia Sheriff  2025                       Primary Care Physician: Spenser Lee MD    CC:   Chief Complaint   Patient presents with    Annual Exam     IUD fell out on Monday, She is c/o heavy vaginal bleeding since Wednesday.  Seen in ER  for AUB         HPI: Maria Antonia Sheriff is a 34 y.o. female     The patient was seen and examined. She is here for an annual visit. She is complaining of heavy vaginal bleeding since her IUD fell out on Monday. She reports feeling something in her vagina and accidentally pulled out the IUD.  Mirena IUD was inserted after delivery on 2022. She is wanting a new IUD today as she was happy with it.   She denies any s/s anemia including lightheadedness, dizziness, palpitations, shortness of breath, or fatigue.    Patient's last menstrual period was 2025 (approximate).. She denies irregular menstrual cycles, denies heavy bleeding, and denies dysmenorrhea. Her periods are regular and last 5-7 days. She describes them as light.     Her bowel habits are regular. She denies any bloating.  She denies dysuria. She denies vaginal discharge.  She is sexually active with one male partner.  She uses IUD for contraception and is not desiring pregnancy.    REVIEW OF SYSTEMS:  Constitutional: negative fever, negative chills  HEENT: negative visual disturbances, negative headaches  Respiratory: negative dyspnea, negative cough  Cardiovascular: negative chest pain,  negative palpitations  Gastrointestinal: negative abdominal pain, negative RUQ pain, negative N/V, negative diarrhea, negative constipation  Genitourinary: negative dysuria, negative vaginal discharge  Dermatological: negative rash  Hematologic: negative bruising  Immunologic/Lymphatic: negative recent illness, negative recent sick contact  Musculoskeletal: negative back pain, negative myalgias, negative arthralgias  Neurological:  negative dizziness, negative

## 2025-02-14 DIAGNOSIS — N76.0 BACTERIAL VAGINOSIS: Primary | ICD-10-CM

## 2025-02-14 DIAGNOSIS — B96.89 BACTERIAL VAGINOSIS: Primary | ICD-10-CM

## 2025-02-14 DIAGNOSIS — Z01.419 WELL WOMAN EXAM WITH ROUTINE GYNECOLOGICAL EXAM: ICD-10-CM

## 2025-02-14 LAB
C TRACH DNA SPEC QL PROBE+SIG AMP: NEGATIVE
CANDIDA SPECIES: NEGATIVE
GARDNERELLA VAGINALIS: POSITIVE
N GONORRHOEA DNA SPEC QL PROBE+SIG AMP: NEGATIVE
SOURCE: ABNORMAL
SPECIMEN DESCRIPTION: NORMAL
TRICHOMONAS: NEGATIVE

## 2025-02-14 RX ORDER — METRONIDAZOLE 500 MG/1
500 TABLET ORAL 2 TIMES DAILY
Qty: 14 TABLET | Refills: 0 | Status: SHIPPED | OUTPATIENT
Start: 2025-02-14 | End: 2025-02-21

## 2025-02-15 LAB
HPV I/H RISK 4 DNA CVX QL NAA+PROBE: DETECTED
HPV SAMPLE: ABNORMAL
HPV, INTERPRETATION: ABNORMAL
HPV16 DNA CVX QL NAA+PROBE: NOT DETECTED
HPV18 DNA CVX QL NAA+PROBE: DETECTED
SPECIMEN DESCRIPTION: ABNORMAL

## 2025-02-17 NOTE — PROGRESS NOTES
Attending Physician Statement  I have discussed the care of Maria Antonia Sheriff, including pertinent history and exam findings,  with the resident. I have reviewed the key elements of all parts of the encounter with the resident.  I agree with the assessment, plan and orders as documented by the resident.  (GE Modifier)    Rosa Riggs,

## 2025-02-27 LAB — CYTOLOGY REPORT: NORMAL
